# Patient Record
Sex: MALE | Race: BLACK OR AFRICAN AMERICAN | Employment: OTHER | URBAN - METROPOLITAN AREA
[De-identification: names, ages, dates, MRNs, and addresses within clinical notes are randomized per-mention and may not be internally consistent; named-entity substitution may affect disease eponyms.]

---

## 2018-01-06 ENCOUNTER — HOSPITAL ENCOUNTER (EMERGENCY)
Age: 83
Discharge: HOME | End: 2018-01-06
Attending: OPHTHALMOLOGY | Admitting: EMERGENCY MEDICINE
Payer: MEDICARE

## 2018-01-06 VITALS
TEMPERATURE: 97.6 F | BODY MASS INDEX: 32.58 KG/M2 | SYSTOLIC BLOOD PRESSURE: 180 MMHG | DIASTOLIC BLOOD PRESSURE: 101 MMHG | HEART RATE: 73 BPM | WEIGHT: 215 LBS | HEIGHT: 68 IN | RESPIRATION RATE: 12 BRPM | OXYGEN SATURATION: 96 %

## 2018-01-06 DIAGNOSIS — N30.01 ACUTE CYSTITIS WITH HEMATURIA: Primary | ICD-10-CM

## 2018-01-06 LAB
APPEARANCE UR: CLEAR
BACTERIA URNS QL MICRO: NEGATIVE /HPF
BILIRUB UR QL: NEGATIVE
COLOR UR: YELLOW
EPITH CASTS URNS QL MICRO: NORMAL /LPF (ref 0–5)
GLUCOSE UR STRIP.AUTO-MCNC: NEGATIVE MG/DL
HGB UR QL STRIP: ABNORMAL
KETONES UR QL STRIP.AUTO: ABNORMAL MG/DL
LEUKOCYTE ESTERASE UR QL STRIP.AUTO: NEGATIVE
NITRITE UR QL STRIP.AUTO: NEGATIVE
PH UR STRIP: 5.5 [PH] (ref 5–8)
PROT UR STRIP-MCNC: NEGATIVE MG/DL
RBC #/AREA URNS HPF: NORMAL /HPF (ref 0–5)
SP GR UR REFRACTOMETRY: 1.02 (ref 1–1.03)
UROBILINOGEN UR QL STRIP.AUTO: 1 EU/DL (ref 0.2–1)
WBC URNS QL MICRO: NORMAL /HPF (ref 0–4)

## 2018-01-06 PROCEDURE — 81001 URINALYSIS AUTO W/SCOPE: CPT | Performed by: PHYSICIAN ASSISTANT

## 2018-01-06 PROCEDURE — 99283 EMERGENCY DEPT VISIT LOW MDM: CPT

## 2018-01-06 PROCEDURE — 87086 URINE CULTURE/COLONY COUNT: CPT | Performed by: PHYSICIAN ASSISTANT

## 2018-01-06 RX ORDER — CEPHALEXIN 500 MG/1
500 CAPSULE ORAL 2 TIMES DAILY
Qty: 14 CAP | Refills: 0 | Status: SHIPPED | OUTPATIENT
Start: 2018-01-06 | End: 2018-01-13

## 2018-01-07 NOTE — ED PROVIDER NOTES
HPI Comments: [de-identified] yo M c/o dysuria and urinary frequency which started this morning around 0300. States he got up to use the bathroom when he noticed the sx. Has persisted throughout the day. Denies fever, chills, CP, SOB, n/v, abdominal pain, back pain. No other complaints. Patient is a [de-identified] y.o. male presenting with frequency. Urinary Frequency    Associated symptoms include frequency. Pertinent negatives include no chills, no flank pain and no back pain. Past Medical History:   Diagnosis Date    Asthma 1/11/2010    Diabetes ([facility] [address]     DJD (degenerative joint disease) 1/11/2010    DJD of shoulder     DM (diabetes mellitus) ([facility] [address] 1/11/2010    Essential hypertension, benign     HTN (hypertension), benign 1/11/2010    Hypercholesteremia 1/11/2010    Sleep apnea        Past Surgical History:   Procedure Laterality Date    HX CYST REMOVAL           Family History:   Problem Relation Age of Onset    Diabetes Sister     Lung Disease Brother     Diabetes Brother        Social History     Social History    Marital status:      Spouse name: N/A    Number of children: N/A    Years of education: N/A     Occupational History    Not on file. Social History Main Topics    Smoking status: Never Smoker    Smokeless tobacco: Never Used    Alcohol use No    Drug use: No    Sexual activity: Not on file     Other Topics Concern    Not on file     Social History Narrative         ALLERGIES: Review of patient's allergies indicates no known allergies. Review of Systems   Constitutional: Negative for chills and fever. Genitourinary: Positive for dysuria and frequency. Negative for flank pain. Musculoskeletal: Negative for back pain. All other systems reviewed and are negative.       Vitals:    01/06/18 1943   BP: (!) 180/101   Pulse: 73   Resp: 12   Temp: 97.6 °F (36.4 °C)   SpO2: 96%   Weight: 97.5 kg (215 lb)   Height: 5' 8\" (1.727 m)            Physical Exam   Constitutional: He is oriented to person, place, and time. He appears well-developed and well-nourished. No distress. HENT:   Head: Normocephalic and atraumatic. Eyes: Conjunctivae are normal.   Neck: Normal range of motion. Neck supple. Cardiovascular: Normal rate, regular rhythm and normal heart sounds. Pulmonary/Chest: Effort normal and breath sounds normal. No respiratory distress. He has no wheezes. He has no rales. Abdominal: Soft. Normal appearance. He exhibits no distension. There is no tenderness. There is no rebound, no guarding and no CVA tenderness. Musculoskeletal: Normal range of motion. Neurological: He is alert and oriented to person, place, and time. Skin: Skin is warm and dry. Psychiatric: He has a normal mood and affect. His behavior is normal. Judgment and thought content normal.   Nursing note and vitals reviewed.        MDM  Number of Diagnoses or Management Options  Acute cystitis with hematuria:     ED Course       Procedures    -------------------------------------------------------------------------------------------------------------------     EKG INTERPRETATIONS:      RADIOLOGY RESULTS:   No orders to display       LABORATORY RESULTS:  Recent Results (from the past 12 hour(s))   URINALYSIS W/ RFLX MICROSCOPIC    Collection Time: 01/06/18  8:15 PM   Result Value Ref Range    Color YELLOW      Appearance CLEAR      Specific gravity 1.023 1.005 - 1.030      pH (UA) 5.5 5.0 - 8.0      Protein NEGATIVE  NEG mg/dL    Glucose NEGATIVE  NEG mg/dL    Ketone TRACE (A) NEG mg/dL    Bilirubin NEGATIVE  NEG      Blood SMALL (A) NEG      Urobilinogen 1.0 0.2 - 1.0 EU/dL    Nitrites NEGATIVE  NEG      Leukocyte Esterase NEGATIVE  NEG     URINE MICROSCOPIC ONLY    Collection Time: 01/06/18  8:15 PM   Result Value Ref Range    WBC NONE 0 - 4 /hpf    RBC 4 to 10 0 - 5 /hpf    Epithelial cells FEW 0 - 5 /lpf    Bacteria NEGATIVE  NEG /hpf           CONSULTATIONS:        PROGRESS NOTES:    8:56 PM Pt well appearing and in NAD. Small blood noted on UA. Will treat for uti as pt symptomatic. Will need f/u with PCP for further eval if sx do not improve. Lengthy D/W pt regarding possible worsening of pt's condition, need for follow up and strict ED return instructions for any worsening symptoms. DISPOSITION:  ED DIAGNOSIS & DISPOSITION INFORMATION  Diagnosis:   1. Acute cystitis with hematuria          Disposition: home    Follow-up Information     Follow up With Details Comments Contact Info    [facility] EMERGENCY DEPT  Immediately if symptoms worsen [address] [place] [id number] [phone]          Patient's Medications   Start Taking    CEPHALEXIN (KEFLEX) 500 MG CAPSULE    Take 1 Cap by mouth two (2) times a day for 7 days. Continue Taking    AMLODIPINE (NORVASC) 10 MG TABLET    TAKE 1 TABLET BY MOUTH ONCE DAILY    ASPIRIN 81 MG CHEWABLE TABLET    Take 81 mg by mouth daily. FUROSEMIDE (LASIX) 20 MG TABLET    Take 1 Tab by mouth daily. LISINOPRIL (PRINIVIL, ZESTRIL) 2.5 MG TABLET    TAKE 1 TABLET BY MOUTH ONCE DAILY    PIOGLITAZONE (ACTOS) 15 MG TABLET    Take 15 mg by mouth. PIOGLITAZONE-METFORMIN (ACTOPLUS MET)  MG PER TABLET    Take 1 Tab by mouth two (2) times daily (with meals). SILDENAFIL CITRATE (VIAGRA) 100 MG TABLET    Take 1 Tab by mouth as needed. These Medications have changed    No medications on file   Stop Taking    ACETAMINOPHEN-CODEINE (TYLENOL #3) 300-30 MG PER TABLET    Take 1-2 tablets by mouth every four (4) hours as needed for Pain. ATORVASTATIN (LIPITOR) 20 MG TABLET    Take 1 Tab by mouth daily. CYCLOBENZAPRINE (FLEXERIL) 10 MG TABLET    Take 1 Tab by mouth three (3) times daily as needed for Muscle Spasm(s). HYDROCODONE-ACETAMINOPHEN (NORCO) 5-325 MG PER TABLET    Take 1 Tab by mouth every four (4) hours as needed for Pain. Max Daily Amount: 6 Tabs.     LEVOTHYROXINE (SYNTHROID) 25 MCG TABLET    Take 25 mcg by mouth Daily (before breakfast).

## 2018-01-07 NOTE — ED TRIAGE NOTES
Pt reports urinary frequency and some discomfort with urination since yesterday. Pt denies abdominal pain, back pain. Denies n/v/d. Denies fever.

## 2018-01-07 NOTE — DISCHARGE INSTRUCTIONS

## 2018-01-08 LAB
BACTERIA SPEC CULT: NORMAL
SERVICE CMNT-IMP: NORMAL

## 2018-01-27 ENCOUNTER — APPOINTMENT (OUTPATIENT)
Dept: GENERAL RADIOLOGY | Age: 83
End: 2018-01-27
Attending: EMERGENCY MEDICINE
Payer: MEDICARE

## 2018-01-27 ENCOUNTER — HOSPITAL ENCOUNTER (EMERGENCY)
Age: 83
Discharge: HOME | End: 2018-01-27
Attending: EMERGENCY MEDICINE
Payer: MEDICARE

## 2018-01-27 ENCOUNTER — APPOINTMENT (OUTPATIENT)
Dept: CT IMAGING | Age: 83
End: 2018-01-27
Attending: PHYSICIAN ASSISTANT
Payer: MEDICARE

## 2018-01-27 VITALS
OXYGEN SATURATION: 95 % | SYSTOLIC BLOOD PRESSURE: 152 MMHG | RESPIRATION RATE: 20 BRPM | HEIGHT: 68 IN | BODY MASS INDEX: 31.98 KG/M2 | DIASTOLIC BLOOD PRESSURE: 77 MMHG | TEMPERATURE: 98.1 F | WEIGHT: 211 LBS | HEART RATE: 83 BPM

## 2018-01-27 DIAGNOSIS — S00.81XA ABRASION OF FACE, INITIAL ENCOUNTER: Primary | ICD-10-CM

## 2018-01-27 DIAGNOSIS — S05.01XA ABRASION OF RIGHT CORNEA, INITIAL ENCOUNTER: ICD-10-CM

## 2018-01-27 DIAGNOSIS — J01.00 ACUTE MAXILLARY SINUSITIS, RECURRENCE NOT SPECIFIED: ICD-10-CM

## 2018-01-27 DIAGNOSIS — S80.211A ABRASION OF RIGHT KNEE, INITIAL ENCOUNTER: ICD-10-CM

## 2018-01-27 DIAGNOSIS — S80.01XA CONTUSION OF RIGHT KNEE, INITIAL ENCOUNTER: ICD-10-CM

## 2018-01-27 DIAGNOSIS — S05.91XA: ICD-10-CM

## 2018-01-27 PROCEDURE — 70486 CT MAXILLOFACIAL W/O DYE: CPT

## 2018-01-27 PROCEDURE — 90715 TDAP VACCINE 7 YRS/> IM: CPT | Performed by: EMERGENCY MEDICINE

## 2018-01-27 PROCEDURE — 74011000250 HC RX REV CODE- 250: Performed by: EMERGENCY MEDICINE

## 2018-01-27 PROCEDURE — 74011250636 HC RX REV CODE- 250/636: Performed by: EMERGENCY MEDICINE

## 2018-01-27 PROCEDURE — 99283 EMERGENCY DEPT VISIT LOW MDM: CPT

## 2018-01-27 PROCEDURE — 73562 X-RAY EXAM OF KNEE 3: CPT

## 2018-01-27 PROCEDURE — 70488 CT MAXILLOFACIAL W/O & W/DYE: CPT

## 2018-01-27 PROCEDURE — 90471 IMMUNIZATION ADMIN: CPT

## 2018-01-27 PROCEDURE — 70450 CT HEAD/BRAIN W/O DYE: CPT

## 2018-01-27 PROCEDURE — 74011250637 HC RX REV CODE- 250/637: Performed by: EMERGENCY MEDICINE

## 2018-01-27 PROCEDURE — 72125 CT NECK SPINE W/O DYE: CPT

## 2018-01-27 RX ORDER — CIPROFLOXACIN HYDROCHLORIDE 3.5 MG/ML
1 SOLUTION/ DROPS TOPICAL 4 TIMES DAILY
Qty: 2 ML | Refills: 0 | Status: SHIPPED | OUTPATIENT
Start: 2018-01-27 | End: 2018-02-06

## 2018-01-27 RX ORDER — AMOXICILLIN AND CLAVULANATE POTASSIUM 875; 125 MG/1; MG/1
1 TABLET, FILM COATED ORAL 2 TIMES DAILY
Qty: 20 TAB | Refills: 0 | Status: SHIPPED | OUTPATIENT
Start: 2018-01-27 | End: 2018-02-06

## 2018-01-27 RX ORDER — ACETAMINOPHEN AND CODEINE PHOSPHATE 300; 30 MG/1; MG/1
2 TABLET ORAL
Status: COMPLETED | OUTPATIENT
Start: 2018-01-27 | End: 2018-01-27

## 2018-01-27 RX ORDER — ACETAMINOPHEN AND CODEINE PHOSPHATE 300; 30 MG/1; MG/1
1-2 TABLET ORAL
Qty: 20 TAB | Refills: 0 | Status: SHIPPED | OUTPATIENT
Start: 2018-01-27 | End: 2019-04-13

## 2018-01-27 RX ORDER — PROPARACAINE HYDROCHLORIDE 5 MG/ML
1 SOLUTION/ DROPS OPHTHALMIC
Status: COMPLETED | OUTPATIENT
Start: 2018-01-27 | End: 2018-01-27

## 2018-01-27 RX ADMIN — ACETAMINOPHEN AND CODEINE PHOSPHATE 2 TABLET: 300; 30 TABLET ORAL at 21:44

## 2018-01-27 RX ADMIN — PROPARACAINE HYDROCHLORIDE 1 DROP: 5 SOLUTION/ DROPS OPHTHALMIC at 19:47

## 2018-01-27 RX ADMIN — FLUORESCEIN SODIUM 1 STRIP: 1 STRIP OPHTHALMIC at 19:46

## 2018-01-27 RX ADMIN — TETANUS TOXOID, REDUCED DIPHTHERIA TOXOID AND ACELLULAR PERTUSSIS VACCINE, ADSORBED 0.5 ML: 5; 2.5; 8; 8; 2.5 SUSPENSION INTRAMUSCULAR at 20:04

## 2018-01-27 NOTE — ED TRIAGE NOTES
Pt states tripped over a jasper earlier and landed on concrete. No LOC. States hit his right eye, right knee. Has blood oozing from right eye.  Pt drove himself here

## 2018-01-28 NOTE — ED NOTES
I have reviewed discharge instructions with the patient. The patient verbalized understanding. Patient armband removed and shredded  Pt accompanied by family member.

## 2018-01-28 NOTE — ED NOTES
Pt reports this afternoon, tripped over a jack that was left in his driveway. Struck right face/eye, right elbow, right knee on concrete. Denies LOC. Denies n/v/headache. Pt is alert & verbal.  Awaiting evaluation of MD.  Call bell within reach, instructed to call for assistance as needed; pt verbalized understanding.

## 2018-01-28 NOTE — ED PROVIDER NOTES
EMERGENCY DEPARTMENT HISTORY AND PHYSICAL EXAM    7:36 PM      Date: 1/27/2018  Patient Name: Rubin Zhong. History of Presenting Illness     Chief Complaint   Patient presents with    Fall    Facial Pain    Eye Injury    Knee Injury         History Provided By: Patient    Chief Complaint: Eye Pain  Duration: 5 Hours  Timing:  Constant  Location: Right  Quality:        Severity: 7 out of 10  Modifying Factors: Tripped and fell onto concrete  Associated Symptoms: right knee pain      Additional History (Context): Rubin Earl is a [de-identified] y.o. male with hx of HTN, DM, DJD, Asthma, and Hypercholesteremia who presents with c/o constant 7/10 right eye pain onset 5 hours after tripped and falling onto concrete. Pt states he was working on his truck when he was walking to the garage and tripped over a car jack striking his eye and right knee on the concrete with bleeding of both areas. Pt states he was able to walk immediately after accident. PCP: Siria Madison MD    Current Outpatient Prescriptions   Medication Sig Dispense Refill    pioglitazone (ACTOS) 15 mg tablet Take 15 mg by mouth.  amLODIPine (NORVASC) 10 mg tablet TAKE 1 TABLET BY MOUTH ONCE DAILY 30 Tab 0    lisinopril (PRINIVIL, ZESTRIL) 2.5 mg tablet TAKE 1 TABLET BY MOUTH ONCE DAILY 30 Tab 0    pioglitazone-metFORMIN (ACTOPLUS MET)  mg per tablet Take 1 Tab by mouth two (2) times daily (with meals). 60 Tab 6    furosemide (LASIX) 20 mg tablet Take 1 Tab by mouth daily. 30 Tab 6    sildenafil citrate (VIAGRA) 100 mg tablet Take 1 Tab by mouth as needed. 4 Each 3    aspirin 81 mg chewable tablet Take 81 mg by mouth daily.          Past History     Past Medical History:  Past Medical History:   Diagnosis Date    Asthma 1/11/2010    Diabetes ([address]     DJD (degenerative joint disease) 1/11/2010    DJD of shoulder     DM (diabetes mellitus) ([facility] [address] 1/11/2010    Essential hypertension, benign     HTN (hypertension), benign 1/11/2010  Hypercholesteremia 1/11/2010    Sleep apnea        Past Surgical History:  Past Surgical History:   Procedure Laterality Date    HX CYST REMOVAL         Family History:  Family History   Problem Relation Age of Onset    Diabetes Sister     Lung Disease Brother     Diabetes Brother        Social History:  Social History   Substance Use Topics    Smoking status: Never Smoker    Smokeless tobacco: Never Used    Alcohol use No       Allergies:  No Known Allergies      Review of Systems       Review of Systems   Constitutional: Negative. HENT: Negative. Eyes: Positive for pain. Respiratory: Negative. Cardiovascular: Negative. Gastrointestinal: Negative. Endocrine: Negative. Genitourinary: Negative. Musculoskeletal:        Right knee pain   Skin: Negative. Allergic/Immunologic: Negative. Neurological: Negative. Hematological: Negative. Psychiatric/Behavioral: Negative. All other systems reviewed and are negative. Physical Exam     Visit Vitals    /77 (BP 1 Location: Left arm)    Pulse 83    Temp 98.1 °F (36.7 °C)    Resp 20    Ht 5' 8\" (1.727 m)    Wt 95.7 kg (211 lb)    SpO2 95%    BMI 32.08 kg/m2         Physical Exam   Constitutional: He is oriented to person, place, and time. He appears well-developed and well-nourished. No distress. HENT:   Head: Normocephalic. Mouth/Throat: Oropharynx is clear and moist.   Eyes: Conjunctivae and EOM are normal. Pupils are equal, round, and reactive to light. Slit lamp exam:       The right eye shows corneal abrasion (from 4 to 1 o'clock) and fluorescein uptake. The right eye shows no foreign body. Vision is fine. Abrasion to right media canton   Neck: Normal range of motion. Neck supple. Cardiovascular: Normal rate, regular rhythm, normal heart sounds and intact distal pulses. No murmur heard. Pulmonary/Chest: Effort normal and breath sounds normal. No respiratory distress. He has no wheezes.  He has no rales. He exhibits no tenderness. Abdominal: Soft. Bowel sounds are normal. He exhibits no distension. There is no tenderness. There is no rebound. Musculoskeletal: Normal range of motion. He exhibits no edema or tenderness. Neurological: He is alert and oriented to person, place, and time. No cranial nerve deficit. He exhibits normal muscle tone. Coordination normal.   Skin: Skin is warm and dry. No rash noted. Psychiatric: He has a normal mood and affect. His behavior is normal. Judgment and thought content normal.   Nursing note and vitals reviewed. Diagnostic Study Results     Labs -  No results found for this or any previous visit (from the past 12 hour(s)). Radiologic Studies -   CT SPINE CERV WO CONT   Final Result   IMPRESSION:     No CT signs cervical spine trauma.      Multilevel advanced changes cervical spine. CT MAXILLOFACIAL W WO CONT   Final Result   IMPRESSION:     No bony injury of the right orbit. Site of the patient's laceration not  confidently seen.     Chronic appearing orbital floor fracture on the left side with depression.     Metallic foreign body just inferior to the left orbit, might be related to  patient's previous injury.     Dental caries at a right sided maxillary molar. CT HEAD WO CONT   Final Result   IMPRESSION:      No acute brain injury.     Mild burden chronic sinusitis. XR KNEE RT 3 V    (Results Pending)   XR Knee Right 8:32 PM ED Provider Interpretation: Signs of arthritis, no acute fracture. Medical Decision Making   I am the first provider for this patient. I reviewed the vital signs, available nursing notes, past medical history, past surgical history, family history and social history. Vital Signs-Reviewed the patient's vital signs.     Pulse Oximetry Analysis -  95% on room air (Interpretation) Normal    Cardiac Monitor:  Rate: 83 bpm  Rhythm:  Normal Sinus Rhythm         Records Reviewed: Nursing Notes (Time of Review: 7:41 PM)    ED Course: Progress Notes, Reevaluation, and Consults:    Provider Notes (Medical Decision Making):   9:25 PM   DDX:  Orbital floor fracture, entrapment, laceration of face, laceration of eyelid, laceration of duct, intercranial hemmorrhage, sinusitis, contusion of knee, fracture of knee, abrasion of knee      Diagnosis     Clinical Impression:   1. Abrasion of face, initial encounter    2. Abrasion of right cornea, initial encounter    3. Acute maxillary sinusitis, recurrence not specified    4. Abrasion of right knee, initial encounter    5. Contusion of right knee, initial encounter        Disposition: Discharge    Follow-up Information     None           Patient's Medications   Start Taking    No medications on file   Continue Taking    AMLODIPINE (NORVASC) 10 MG TABLET    TAKE 1 TABLET BY MOUTH ONCE DAILY    ASPIRIN 81 MG CHEWABLE TABLET    Take 81 mg by mouth daily. FUROSEMIDE (LASIX) 20 MG TABLET    Take 1 Tab by mouth daily. LISINOPRIL (PRINIVIL, ZESTRIL) 2.5 MG TABLET    TAKE 1 TABLET BY MOUTH ONCE DAILY    PIOGLITAZONE (ACTOS) 15 MG TABLET    Take 15 mg by mouth. PIOGLITAZONE-METFORMIN (ACTOPLUS MET)  MG PER TABLET    Take 1 Tab by mouth two (2) times daily (with meals). SILDENAFIL CITRATE (VIAGRA) 100 MG TABLET    Take 1 Tab by mouth as needed. These Medications have changed    No medications on file   Stop Taking    No medications on file     _______________________________    Attestations:  [address] [place] [address] acting as a scribe for and in the presence of Gema Brower MD      January 27, 2018 at 7:41 PM       Provider Attestation:      I personally performed the services described in the documentation, reviewed the documentation, as recorded by the scribe in my presence, and it accurately and completely records my words and actions.  January 27, 2018 at 7:41 PM - Gema Brower MD    _______________________________

## 2018-01-28 NOTE — DISCHARGE INSTRUCTIONS
Corneal Scratches: Care Instructions  Your Care Instructions    The cornea is the clear surface that covers the front of the eye. When a speck of dirt, a wood chip, an insect, or another object flies into your eye, it can cause a painful scratch on the cornea. Wearing contact lenses too long or rubbing your eyes can also scratch the cornea. Small scratches usually heal in a day or two. Deeper scratches may take longer. If you have had a foreign object removed from your eye or you have a corneal scratch, you will need to watch for infection and vision problems while your eye heals. Follow-up care is a key part of your treatment and safety. Be sure to make and go to all appointments, and call your doctor if you are having problems. It's also a good idea to know your test results and keep a list of the medicines you take. How can you care for yourself at home? · The doctor probably used a medicine during your exam to numb your eye. When it wears off in 30 to 60 minutes, your eye pain may come back. Take pain medicines exactly as directed. ¨ If the doctor gave you a prescription medicine for pain, take it as prescribed. ¨ If you are not taking a prescription pain medicine, ask your doctor if you can take an over-the-counter medicine. ¨ Do not take two or more pain medicines at the same time unless the doctor told you to. Many pain medicines have acetaminophen, which is Tylenol. Too much acetaminophen (Tylenol) can be harmful. · Do not rub your injured eye. Rubbing can make it worse. · Use the prescribed eyedrops or ointment as directed. Be sure the dropper or bottle tip is clean. To put in eyedrops or ointment:  ¨ Tilt your head back, and pull your lower eyelid down with one finger. ¨ Drop or squirt the medicine inside the lower lid. ¨ Close your eye for 30 to 60 seconds to let the drops or ointment move around. ¨ Do not touch the ointment or dropper tip to your eyelashes or any other surface.   · Do not use your contact lens in your hurt eye until your doctor says you can. Also, do not wear eye makeup until your eye has healed. · Do not drive if you have blurred vision. · Bright light may hurt. Sunglasses can help. · To prevent eye injuries in the future, wear safety glasses or goggles when you work with machines or tools, mow the lawn, or ride a bike or motorcycle. When should you call for help? Call your doctor now or seek immediate medical care if:  ? · You have signs of an eye infection, such as:  ¨ Pus or thick discharge coming from the eye. ¨ Redness or swelling around the eye. ¨ A fever. ? · You have new or worse eye pain. ? · You have vision changes. ? · It feels like there is something in your eye. ? · Light hurts your eye. ? Watch closely for changes in your health, and be sure to contact your doctor if:  ? · You do not get better as expected. Where can you learn more? Go to http://farideh-rob.info/. Enter W973 in the search box to learn more about \"Corneal Scratches: Care Instructions. \"  Current as of: March 3, 2017  Content Version: 11.4  © 8518-5205 [employer]. Care instructions adapted under license by [employer] (which disclaims liability or warranty for this information). If you have questions about a medical condition or this instruction, always ask your healthcare professional. [place] [address] any warranty or liability for your use of this information. Contusion: Care Instructions  Your Care Instructions    Contusion is the medical term for a bruise. It is the result of a direct blow or an impact, such as a fall. Contusions are common sports injuries. Most people think of a bruise as a black-and-blue spot. This happens when small blood vessels get torn and leak blood under the skin. But bones, muscles, and organs can also get bruised. This may damage deep tissues but not cause a bruise you can see.   The doctor will do a physical exam to find the location of your contusion. You may also have tests to make sure you do not have a more serious injury, such as a broken bone or nerve damage. These may include X-rays or other imaging tests like a CT scan or MRI. Deep-tissue contusions may cause pain and swelling. But if there is no serious damage, they will often get better in a few weeks with home treatment. The doctor has checked you carefully, but problems can develop later. If you notice any problems or new symptoms, get medical treatment right away. Follow-up care is a key part of your treatment and safety. Be sure to make and go to all appointments, and call your doctor if you are having problems. It's also a good idea to know your test results and keep a list of the medicines you take. How can you care for yourself at home? · Put ice or a cold pack on the sore area for 10 to 20 minutes at a time to stop swelling. Put a thin cloth between the ice pack and your skin. · Be safe with medicines. Read and follow all instructions on the label. ¨ If the doctor gave you a prescription medicine for pain, take it as prescribed. ¨ If you are not taking a prescription pain medicine, ask your doctor if you can take an over-the-counter medicine. · If you can, prop up the sore area on pillows as much as possible for the next few days. Try to keep the sore area above the level of your heart. When should you call for help? Call your doctor now or seek immediate medical care if:  ? · Your pain gets worse. ? · You have new or worse swelling. ? · You have tingling, weakness, or numbness in the area near the contusion. ? · The area near the contusion is cold or pale. ? Watch closely for changes in your health, and be sure to contact your doctor if:  ? · You do not get better as expected. Where can you learn more? Go to http://farideh-rob.info/.   Enter P258 in the search box to learn more about \"Contusion: Care Instructions. \"  Current as of: March 20, 2017  Content Version: 11.4  © 1258-4367 [employer]. Care instructions adapted under license by [employer] (which disclaims liability or warranty for this information). If you have questions about a medical condition or this instruction, always ask your healthcare professional. [address] any warranty or liability for your use of this information. Sinusitis: Care Instructions  Your Care Instructions    Sinusitis is an infection of the lining of the sinus cavities in your head. Sinusitis often follows a cold. It causes pain and pressure in your head and face. In most cases, sinusitis gets better on its own in 1 to 2 weeks. But some mild symptoms may last for several weeks. Sometimes antibiotics are needed. Follow-up care is a key part of your treatment and safety. Be sure to make and go to all appointments, and call your doctor if you are having problems. It's also a good idea to know your test results and keep a list of the medicines you take. How can you care for yourself at home? · Take an over-the-counter pain medicine, such as acetaminophen (Tylenol), ibuprofen (Advil, Motrin), or naproxen (Aleve). Read and follow all instructions on the label. · If the doctor prescribed antibiotics, take them as directed. Do not stop taking them just because you feel better. You need to take the full course of antibiotics. · Be careful when taking over-the-counter cold or flu medicines and Tylenol at the same time. Many of these medicines have acetaminophen, which is Tylenol. Read the labels to make sure that you are not taking more than the recommended dose. Too much acetaminophen (Tylenol) can be harmful. · Breathe warm, moist air from a steamy shower, a hot bath, or a sink filled with hot water. Avoid cold, dry air. Using a humidifier in your home may help.  Follow the directions for cleaning the machine. · Use saline (saltwater) nasal washes to help keep your nasal passages open and wash out mucus and bacteria. You can buy saline nose drops at a grocery store or drugstore. Or you can make your own at home by adding 1 teaspoon of salt and 1 teaspoon of baking soda to 2 cups of distilled water. If you make your own, fill a bulb syringe with the solution, insert the tip into your nostril, and squeeze gently. Jarocho Cheri your nose. · Put a hot, wet towel or a warm gel pack on your face 3 or 4 times a day for 5 to 10 minutes each time. · Try a decongestant nasal spray like oxymetazoline (Afrin). Do not use it for more than 3 days in a row. Using it for more than 3 days can make your congestion worse. When should you call for help? Call your doctor now or seek immediate medical care if:  ? · You have new or worse swelling or redness in your face or around your eyes. ? · You have a new or higher fever. ? Watch closely for changes in your health, and be sure to contact your doctor if:  ? · You have new or worse facial pain. ? · The mucus from your nose becomes thicker (like pus) or has new blood in it. ? · You are not getting better as expected. Where can you learn more? Go to http://farideh-rob.info/. Enter X952 in the search box to learn more about \"Sinusitis: Care Instructions. \"  Current as of: May 12, 2017  Content Version: 11.4  © 7608-9982 [employer]. Care instructions adapted under license by [email or web address] (which disclaims liability or warranty for this information). If you have questions about a medical condition or this instruction, always ask your healthcare professional. [place] [address] any warranty or liability for your use of this information. Scrapes (Abrasions): Care Instructions  Your Care Instructions  Scrapes (abrasions) are wounds where your skin has been rubbed or torn off.  Most scrapes do not go deep into the skin, but some may remove several layers of skin. Scrapes usually don't bleed much, but they may ooze pinkish fluid. Scrapes on the head or face may appear worse than they are. They may bleed a lot because of the good blood supply to this area. Most scrapes heal well and may not need a bandage. They usually heal within 3 to 7 days. A large, deep scrape may take 1 to 2 weeks or longer to heal. A scab may form on some scrapes. Follow-up care is a key part of your treatment and safety. Be sure to make and go to all appointments, and call your doctor if you are having problems. It's also a good idea to know your test results and keep a list of the medicines you take. How can you care for yourself at home? · If your doctor told you how to care for your wound, follow your doctor's instructions. If you did not get instructions, follow this general advice:  ¨ Wash the scrape with clean water 2 times a day. Don't use hydrogen peroxide or alcohol, which can slow healing. ¨ You may cover the scrape with a thin layer of petroleum jelly, such as Vaseline, and a nonstick bandage. ¨ Apply more petroleum jelly and replace the bandage as needed. · Prop up the injured area on a pillow anytime you sit or lie down during the next 3 days. Try to keep it above the level of your heart. This will help reduce swelling. · Be safe with medicines. Take pain medicines exactly as directed. ¨ If the doctor gave you a prescription medicine for pain, take it as prescribed. ¨ If you are not taking a prescription pain medicine, ask your doctor if you can take an over-the-counter medicine. When should you call for help? Call your doctor now or seek immediate medical care if:  ? · You have signs of infection, such as:  ¨ Increased pain, swelling, warmth, or redness around the scrape. ¨ Red streaks leading from the scrape. ¨ Pus draining from the scrape. ¨ A fever. ? · The scrape starts to bleed, and blood soaks through the bandage.  Oozing small amounts of blood is normal.   ? Watch closely for changes in your health, and be sure to contact your doctor if the scrape is not getting better each day. Where can you learn more? Go to http://farideh-rob.info/. Enter A374 in the search box to learn more about \"Scrapes (Abrasions): Care Instructions. \"  Current as of: March 20, 2017  Content Version: 11.4  © 5314-1578 [employer]. Care instructions adapted under license by [employer] (which disclaims liability or warranty for this information). If you have questions about a medical condition or this instruction, always ask your healthcare professional. [address] any warranty or liability for your use of this information.

## 2018-03-12 ENCOUNTER — HOSPITAL ENCOUNTER (OUTPATIENT)
Dept: BONE DENSITY | Age: 83
Discharge: HOME | End: 2018-03-12
Attending: FAMILY MEDICINE
Payer: MEDICARE

## 2018-03-12 DIAGNOSIS — M81.0 OSTEOPOROSIS: ICD-10-CM

## 2018-03-12 PROCEDURE — 77080 DXA BONE DENSITY AXIAL: CPT

## 2019-04-13 ENCOUNTER — HOSPITAL ENCOUNTER (EMERGENCY)
Age: 84
Discharge: HOME | End: 2019-04-13
Attending: EMERGENCY MEDICINE
Payer: MEDICARE

## 2019-04-13 ENCOUNTER — APPOINTMENT (OUTPATIENT)
Dept: GENERAL RADIOLOGY | Age: 84
End: 2019-04-13
Attending: EMERGENCY MEDICINE
Payer: MEDICARE

## 2019-04-13 VITALS
RESPIRATION RATE: 17 BRPM | OXYGEN SATURATION: 99 % | HEIGHT: 70 IN | WEIGHT: 214 LBS | TEMPERATURE: 97.6 F | HEART RATE: 76 BPM | DIASTOLIC BLOOD PRESSURE: 76 MMHG | BODY MASS INDEX: 30.64 KG/M2 | SYSTOLIC BLOOD PRESSURE: 138 MMHG

## 2019-04-13 DIAGNOSIS — M79.602 PAIN OF LEFT UPPER EXTREMITY: Primary | ICD-10-CM

## 2019-04-13 LAB
ALBUMIN SERPL-MCNC: 3.8 G/DL (ref 3.4–5)
ALBUMIN/GLOB SERPL: 1 {RATIO} (ref 0.8–1.7)
ALP SERPL-CCNC: 50 U/L (ref 45–117)
ALT SERPL-CCNC: 26 U/L (ref 16–61)
ANION GAP SERPL CALC-SCNC: 7 MMOL/L (ref 3–18)
AST SERPL-CCNC: 16 U/L (ref 15–37)
BASOPHILS # BLD: 0 K/UL (ref 0–0.1)
BASOPHILS NFR BLD: 0 % (ref 0–2)
BILIRUB SERPL-MCNC: 0.8 MG/DL (ref 0.2–1)
BUN SERPL-MCNC: 20 MG/DL (ref 7–18)
BUN/CREAT SERPL: 19 (ref 12–20)
CALCIUM SERPL-MCNC: 8.7 MG/DL (ref 8.5–10.1)
CHLORIDE SERPL-SCNC: 107 MMOL/L (ref 100–108)
CK MB CFR SERPL CALC: 1.4 % (ref 0–4)
CK MB CFR SERPL CALC: 1.6 % (ref 0–4)
CK MB SERPL-MCNC: 2.1 NG/ML (ref 5–25)
CK MB SERPL-MCNC: 2.7 NG/ML (ref 5–25)
CK SERPL-CCNC: 150 U/L (ref 39–308)
CK SERPL-CCNC: 167 U/L (ref 39–308)
CO2 SERPL-SCNC: 30 MMOL/L (ref 21–32)
CREAT SERPL-MCNC: 1.05 MG/DL (ref 0.6–1.3)
DIFFERENTIAL METHOD BLD: ABNORMAL
EOSINOPHIL # BLD: 0.1 K/UL (ref 0–0.4)
EOSINOPHIL NFR BLD: 1 % (ref 0–5)
ERYTHROCYTE [DISTWIDTH] IN BLOOD BY AUTOMATED COUNT: 12.4 % (ref 11.6–14.5)
GLOBULIN SER CALC-MCNC: 3.8 G/DL (ref 2–4)
GLUCOSE SERPL-MCNC: 146 MG/DL (ref 74–99)
HCT VFR BLD AUTO: 43.1 % (ref 36–48)
HGB BLD-MCNC: 14 G/DL (ref 13–16)
LYMPHOCYTES # BLD: 2.6 K/UL (ref 0.9–3.6)
LYMPHOCYTES NFR BLD: 43 % (ref 21–52)
MCH RBC QN AUTO: 29.9 PG (ref 24–34)
MCHC RBC AUTO-ENTMCNC: 32.5 G/DL (ref 31–37)
MCV RBC AUTO: 91.9 FL (ref 74–97)
MONOCYTES # BLD: 0.6 K/UL (ref 0.05–1.2)
MONOCYTES NFR BLD: 9 % (ref 3–10)
NEUTS SEG # BLD: 2.9 K/UL (ref 1.8–8)
NEUTS SEG NFR BLD: 47 % (ref 40–73)
PLATELET # BLD AUTO: 179 K/UL (ref 135–420)
PMV BLD AUTO: 10 FL (ref 9.2–11.8)
POTASSIUM SERPL-SCNC: 3.8 MMOL/L (ref 3.5–5.5)
PROT SERPL-MCNC: 7.6 G/DL (ref 6.4–8.2)
RBC # BLD AUTO: 4.69 M/UL (ref 4.7–5.5)
SODIUM SERPL-SCNC: 144 MMOL/L (ref 136–145)
TROPONIN I SERPL-MCNC: 0.02 NG/ML (ref 0–0.04)
TROPONIN I SERPL-MCNC: <0.02 NG/ML (ref 0–0.04)
WBC # BLD AUTO: 6.1 K/UL (ref 4.6–13.2)

## 2019-04-13 PROCEDURE — 82550 ASSAY OF CK (CPK): CPT

## 2019-04-13 PROCEDURE — 93005 ELECTROCARDIOGRAM TRACING: CPT

## 2019-04-13 PROCEDURE — 74011250636 HC RX REV CODE- 250/636: Performed by: EMERGENCY MEDICINE

## 2019-04-13 PROCEDURE — 80053 COMPREHEN METABOLIC PANEL: CPT

## 2019-04-13 PROCEDURE — 96374 THER/PROPH/DIAG INJ IV PUSH: CPT

## 2019-04-13 PROCEDURE — 99285 EMERGENCY DEPT VISIT HI MDM: CPT

## 2019-04-13 PROCEDURE — 73060 X-RAY EXAM OF HUMERUS: CPT

## 2019-04-13 PROCEDURE — 71045 X-RAY EXAM CHEST 1 VIEW: CPT

## 2019-04-13 PROCEDURE — 85025 COMPLETE CBC W/AUTO DIFF WBC: CPT

## 2019-04-13 PROCEDURE — 74011250637 HC RX REV CODE- 250/637: Performed by: EMERGENCY MEDICINE

## 2019-04-13 PROCEDURE — 96375 TX/PRO/DX INJ NEW DRUG ADDON: CPT

## 2019-04-13 RX ORDER — MORPHINE SULFATE 2 MG/ML
2 INJECTION, SOLUTION INTRAMUSCULAR; INTRAVENOUS
Status: COMPLETED | OUTPATIENT
Start: 2019-04-13 | End: 2019-04-13

## 2019-04-13 RX ORDER — ONDANSETRON 2 MG/ML
4 INJECTION INTRAMUSCULAR; INTRAVENOUS
Status: COMPLETED | OUTPATIENT
Start: 2019-04-13 | End: 2019-04-13

## 2019-04-13 RX ORDER — ACETAMINOPHEN AND CODEINE PHOSPHATE 300; 30 MG/1; MG/1
1 TABLET ORAL
Qty: 10 TAB | Refills: 0 | Status: SHIPPED | OUTPATIENT
Start: 2019-04-13 | End: 2019-04-16

## 2019-04-13 RX ORDER — GUAIFENESIN 100 MG/5ML
324 LIQUID (ML) ORAL
Status: COMPLETED | OUTPATIENT
Start: 2019-04-13 | End: 2019-04-13

## 2019-04-13 RX ADMIN — ASPIRIN 81 MG 324 MG: 81 TABLET ORAL at 10:39

## 2019-04-13 RX ADMIN — MORPHINE SULFATE 2 MG: 2 INJECTION, SOLUTION INTRAMUSCULAR; INTRAVENOUS at 10:54

## 2019-04-13 RX ADMIN — ONDANSETRON 4 MG: 2 INJECTION INTRAMUSCULAR; INTRAVENOUS at 10:54

## 2019-04-13 NOTE — ED PROVIDER NOTES
EMERGENCY DEPARTMENT HISTORY AND PHYSICAL EXAM 
 
10:37 AM 
 
 
Date: 4/13/2019 Patient Name: Hong Fonseca. History of Presenting Illness Chief Complaint Patient presents with  Arm Pain History Provided By: Patient Additional History (Context): Hong Sinha is a 82 y.o. male with diabetes and hypertension who presents with left arm pain that started yesterday. 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 He denies any chest pain, shortness of breath, nausea, vomiting or diarrhea. He denies any trauma to the left arm. He did not take his aspirin today. Pain to the left arm is 10 out of 10 nonradiating. Nice smoking alcohol or recreational drug use PCP: Gricelda, MD Siria 
 
 
Current Outpatient Medications Medication Sig Dispense Refill  acetaminophen-codeine (TYLENOL-CODEINE #3) 300-30 mg per tablet Take 1 Tab by mouth every four (4) hours as needed for Pain for up to 3 days. Max Daily Amount: 6 Tabs. 10 Tab 0  pioglitazone (ACTOS) 15 mg tablet Take 15 mg by mouth.  amLODIPine (NORVASC) 10 mg tablet TAKE 1 TABLET BY MOUTH ONCE DAILY 30 Tab 0  
 lisinopril (PRINIVIL, ZESTRIL) 2.5 mg tablet TAKE 1 TABLET BY MOUTH ONCE DAILY 30 Tab 0  pioglitazone-metFORMIN (ACTOPLUS MET)  mg per tablet Take 1 Tab by mouth two (2) times daily (with meals). 60 Tab 6  furosemide (LASIX) 20 mg tablet Take 1 Tab by mouth daily. 30 Tab 6  
 sildenafil citrate (VIAGRA) 100 mg tablet Take 1 Tab by mouth as needed. 4 Each 3  
 aspirin 81 mg chewable tablet Take 81 mg by mouth daily. Past History Past Medical History: 
Past Medical History:  
Diagnosis Date  Asthma 1/11/2010  Diabetes ([address]  DJD (degenerative joint disease) 1/11/2010  DJD of shoulder  DM (diabetes mellitus) ([address] 1/11/2010  Essential hypertension, benign  HTN (hypertension), benign 1/11/2010  Hypercholesteremia 1/11/2010  Sleep apnea Past Surgical History: 
Past Surgical History:  
Procedure Laterality Date  HX CYST REMOVAL Family History: 
Family History Problem Relation Age of Onset  Diabetes Sister  Lung Disease Brother  Diabetes Brother Social History: 
Social History Tobacco Use  Smoking status: Never Smoker  Smokeless tobacco: Never Used Substance Use Topics  Alcohol use: No  
 Drug use: No  
 
 
Allergies: 
No Known Allergies Review of Systems Review of Systems Constitutional: Negative. Negative for chills, diaphoresis and fever. HENT: Negative. Negative for congestion, rhinorrhea and sore throat. Eyes: Negative. Negative for pain, discharge and redness. Respiratory: Negative. Negative for cough, chest tightness, shortness of breath and wheezing. Cardiovascular: Negative. Negative for chest pain. Gastrointestinal: Negative. Negative for abdominal pain, constipation, diarrhea, nausea and vomiting. Genitourinary: Negative. Negative for dysuria, flank pain, frequency, hematuria and urgency. Musculoskeletal: Negative. Negative for back pain and neck pain. Skin: Negative. Negative for rash. Neurological: Negative. Negative for syncope, weakness, numbness and headaches. Psychiatric/Behavioral: Negative. All other systems reviewed and are negative. Physical Exam  
 
Visit Vitals /76 Pulse 76 Temp 97.6 °F (36.4 °C) Resp 17 Ht 5' 10\" (1.778 m) Wt 97.1 kg (214 lb) SpO2 99% BMI 30.71 kg/m² Physical Exam  
Constitutional: He appears well-developed and well-nourished. Non-toxic appearance. He does not have a sickly appearance. He does not appear ill. No distress. HENT:  
Head: Normocephalic and atraumatic. Mouth/Throat: Oropharynx is clear and moist. No oropharyngeal exudate. Eyes: Pupils are equal, round, and reactive to light. Conjunctivae and EOM are normal. No scleral icterus. Neck: Normal range of motion. Neck supple. No hepatojugular reflux and no JVD present. No tracheal deviation present. No thyromegaly present. Cardiovascular: Normal rate, regular rhythm, S1 normal, S2 normal, normal heart sounds, intact distal pulses and normal pulses. Exam reveals no gallop, no S3 and no S4. No murmur heard. Pulses: 
     Radial pulses are 2+ on the right side, and 2+ on the left side. Dorsalis pedis pulses are 2+ on the right side, and 2+ on the left side. Pulmonary/Chest: Effort normal and breath sounds normal. No respiratory distress. He has no decreased breath sounds. He has no wheezes. He has no rhonchi. He has no rales. Abdominal: Soft. Normal appearance and bowel sounds are normal. He exhibits no distension and no mass. There is no hepatosplenomegaly. There is no tenderness. There is no rigidity, no rebound, no guarding, no CVA tenderness, no tenderness at McBurney's point and negative Figueroa's sign. Musculoskeletal: Normal range of motion. Left upper arm: He exhibits tenderness. He exhibits no bony tenderness, no swelling, no edema, no deformity and no laceration. Arms: 
Lymphadenopathy:  
     Head (right side): No submental, no submandibular, no preauricular and no occipital adenopathy present. Head (left side): No submental, no submandibular, no preauricular and no occipital adenopathy present. He has no cervical adenopathy. Right: No supraclavicular adenopathy present. Left: No supraclavicular adenopathy present. Neurological: He is alert. He has normal strength and normal reflexes. He is not disoriented. No cranial nerve deficit or sensory deficit. Coordination and gait normal. GCS eye subscore is 4. GCS verbal subscore is 5. GCS motor subscore is 6. Skin: Skin is warm, dry and intact. No rash noted. He is not diaphoretic. Psychiatric: He has a normal mood and affect.  His speech is normal and behavior is normal. Judgment and thought content normal. Cognition and memory are normal.  
Nursing note and vitals reviewed. Diagnostic Study Results Labs - Recent Results (from the past 12 hour(s)) EKG, 12 LEAD, INITIAL Collection Time: 04/13/19 10:34 AM  
Result Value Ref Range Ventricular Rate 74 BPM  
 Atrial Rate 74 BPM  
 P-R Interval 166 ms  
 QRS Duration 92 ms Q-T Interval 388 ms QTC Calculation (Bezet) 430 ms Calculated P Axis 54 degrees Calculated R Axis 49 degrees Calculated T Axis 43 degrees Diagnosis Normal sinus rhythm with sinus arrhythmia Nonspecific T wave abnormality Abnormal ECG When compared with ECG of 15-JUL-2015 20:12, No significant change was found CBC WITH AUTOMATED DIFF Collection Time: 04/13/19 10:45 AM  
Result Value Ref Range WBC 6.1 4.6 - 13.2 K/uL  
 RBC 4.69 (L) 4.70 - 5.50 M/uL  
 HGB 14.0 13.0 - 16.0 g/dL HCT 43.1 36.0 - 48.0 % MCV 91.9 74.0 - 97.0 FL  
 MCH 29.9 24.0 - 34.0 PG  
 MCHC 32.5 31.0 - 37.0 g/dL  
 RDW 12.4 11.6 - 14.5 % PLATELET 238 951 - 591 K/uL MPV 10.0 9.2 - 11.8 FL  
 NEUTROPHILS 47 40 - 73 % LYMPHOCYTES 43 21 - 52 % MONOCYTES 9 3 - 10 % EOSINOPHILS 1 0 - 5 % BASOPHILS 0 0 - 2 %  
 ABS. NEUTROPHILS 2.9 1.8 - 8.0 K/UL  
 ABS. LYMPHOCYTES 2.6 0.9 - 3.6 K/UL  
 ABS. MONOCYTES 0.6 0.05 - 1.2 K/UL  
 ABS. EOSINOPHILS 0.1 0.0 - 0.4 K/UL  
 ABS. BASOPHILS 0.0 0.0 - 0.1 K/UL  
 DF AUTOMATED METABOLIC PANEL, COMPREHENSIVE Collection Time: 04/13/19 10:45 AM  
Result Value Ref Range Sodium 144 136 - 145 mmol/L Potassium 3.8 3.5 - 5.5 mmol/L Chloride 107 100 - 108 mmol/L  
 CO2 30 21 - 32 mmol/L Anion gap 7 3.0 - 18 mmol/L Glucose 146 (H) 74 - 99 mg/dL BUN 20 (H) 7.0 - 18 MG/DL Creatinine 1.05 0.6 - 1.3 MG/DL  
 BUN/Creatinine ratio 19 12 - 20 GFR est AA >60 >60 ml/min/1.73m2 GFR est non-AA >60 >60 ml/min/1.73m2  Calcium 8.7 8.5 - 10.1 MG/DL  
 Bilirubin, total 0.8 0.2 - 1.0 MG/DL  
 ALT (SGPT) 26 16 - 61 U/L  
 AST (SGOT) 16 15 - 37 U/L Alk. phosphatase 50 45 - 117 U/L Protein, total 7.6 6.4 - 8.2 g/dL Albumin 3.8 3.4 - 5.0 g/dL Globulin 3.8 2.0 - 4.0 g/dL A-G Ratio 1.0 0.8 - 1.7 CARDIAC PANEL,(CK, CKMB & TROPONIN) Collection Time: 04/13/19 10:45 AM  
Result Value Ref Range  39 - 308 U/L  
 CK - MB 2.7 <3.6 ng/ml CK-MB Index 1.6 0.0 - 4.0 % Troponin-I, QT <0.02 0.0 - 0.045 NG/ML  
CARDIAC PANEL,(CK, CKMB & TROPONIN) Collection Time: 04/13/19  1:35 PM  
Result Value Ref Range  39 - 308 U/L  
 CK - MB 2.1 <3.6 ng/ml CK-MB Index 1.4 0.0 - 4.0 % Troponin-I, QT 0.02 0.0 - 0.045 NG/ML Radiologic Studies -  
XR CHEST PORT Final Result IMPRESSION: Stable chest. No acute findings. XR HUMERUS LT Final Result IMPRESSION:  
  
Humerus appears intact. No acute findings. Medical Decision Making Provider Notes (Medical Decision Making): MDM Number of Diagnoses or Management Options Pain of left upper extremity:  
Diagnosis management comments: DIFFERENTIAL DIAGNOSES/ MEDICAL DECISION MAKING: 
Chest pain etiologies include acute cardiac events to include possible acute myocardial infarction, acute coronary syndrome, pneumonia, chest wall pain (myofascial/ musculoskeletal etiology), chronic obstructive pulmonary disease (copd), acute asthma exacerbation, congestive heart failure, acute bronchitis, pulmonary embolism, upper respiratory infection, referred abdominal pain, other etiologies, versus combination of the above. I am the first provider for this patient. I reviewed the vital signs, available nursing notes, past medical history, past surgical history, family history and social history. Vital Signs-Reviewed the patient's vital signs. Records Reviewed: Nursing Notes (Time of Review: 10:37 AM) ED Course: Progress Notes, Reevaluation, and Consults: Labs essentially normal.  Cardiac enzymes negative. Chest X-Ray showed No acute process. X-ray of the left humerus showed no acute process. EKG showed NSR with rate of 74 bpm. With no ST elevations or depression and non specific T wave changes. 10:37 AM 4/13/2019 Patient reassessed. No pain at this time. Discussed repeat troponin. Repeat troponin negative 2. Aspirin: Aspirin was given Diagnosis I have reassessed the patient. Patient is feeling well. Pain free at this time. Patient will be prescribed Tylenol #3. Patient was discharged in stable condition. Patient is to return to emergency department if any new or worsening condition. Clinical Impression: 1. Pain of left upper extremity Disposition: Discharged home Follow-up Information Follow up With Specialties Details Why Contact Info [address] [place] [address] 
[phone] Attestation Provider Attestation:    
I personally performed the services described in the documentation, reviewed the documentation and it accurately and completely records my words and actions utilizing the [address] [place] [address] April 13, 2019 at 2:36 PM - Nadia Wise DO Disclaimer. It is dictated using utilizing voice recognition software. Unfortunately this leads to occasional typographical errors. I apologize in advance if the situation occurs. If questions arise please do not hesitate to contact me or call our department.

## 2019-04-13 NOTE — ED NOTES
Comfort Carlson is a 82 y.o. male that was discharged in stable condition. The patients diagnosis, condition and treatment were explained to  patient and aftercare instructions were given. The patient verbalized understanding. Patient armband removed and shredded.

## 2019-04-14 LAB
ATRIAL RATE: 74 BPM
CALCULATED P AXIS, ECG09: 54 DEGREES
CALCULATED R AXIS, ECG10: 49 DEGREES
CALCULATED T AXIS, ECG11: 43 DEGREES
DIAGNOSIS, 93000: NORMAL
P-R INTERVAL, ECG05: 166 MS
Q-T INTERVAL, ECG07: 388 MS
QRS DURATION, ECG06: 92 MS
QTC CALCULATION (BEZET), ECG08: 430 MS
VENTRICULAR RATE, ECG03: 74 BPM

## 2019-07-11 ENCOUNTER — OFFICE VISIT (OUTPATIENT)
Dept: ORTHOPEDIC SURGERY | Facility: CLINIC | Age: 84
End: 2019-07-11

## 2019-07-11 VITALS
OXYGEN SATURATION: 95 % | HEART RATE: 66 BPM | RESPIRATION RATE: 16 BRPM | DIASTOLIC BLOOD PRESSURE: 64 MMHG | TEMPERATURE: 95.9 F | WEIGHT: 214.8 LBS | BODY MASS INDEX: 30.75 KG/M2 | SYSTOLIC BLOOD PRESSURE: 133 MMHG | HEIGHT: 70 IN

## 2019-07-11 DIAGNOSIS — R20.2 NUMBNESS AND TINGLING IN LEFT HAND: ICD-10-CM

## 2019-07-11 DIAGNOSIS — S66.812A RUPTURE OF FLEXOR TENDON OF LEFT HAND, INITIAL ENCOUNTER: Primary | ICD-10-CM

## 2019-07-11 DIAGNOSIS — R20.0 NUMBNESS AND TINGLING IN LEFT HAND: ICD-10-CM

## 2019-07-11 NOTE — PROGRESS NOTES
Patient: Carlene Torres MRN: [id number]       SSN: [id number]  YOB: 1935        AGE: 82 y.o. SEX: male    PCP: Siria Madison MD  07/11/19    Chief Complaint   Patient presents with    Hand Pain     left, NKI    Arm Pain     left, NKI    Elbow Pain     left, NKI     HISTORY:  Carlene Torres is a 82 y.o. male who is seen for left wrist and hand pain. He has been experiencing numbness and tingling for the past 3 weeks. He woke up with a needle like pain and was unable to flex his left index finger. He is experiencing numbness and tingling in his left ring and little finger. He reports difficulty pinching, grabbing, and holding items. He reports increased numbness and tingling in the ulnar distribution at night and with use. Pain Assessment  7/11/2019   Location of Pain Arm;Hand;Elbow   Location Modifiers Left   Quality of Pain Aching   Quality of Pain Comment numbness   Duration of Pain Persistent   Frequency of Pain Constant   Aggravating Factors (No Data)   Aggravating Factors Comment worse when using left upper extremity   Limiting Behavior Some   Relieving Factors Other (Comment)   Relieving Factors Comment RX meds alittle   Result of Injury No     Occupation, etc:  Mr. Riki Castañeda is a retired . He previously worked at [employer] and [employer]. He retired 15 years ago from [employer]. He lives in [place] with his wife, step-daughter and her . He is a diabetic. Mr. Riki Castañeda weighs 214 lbs and is 5'10\" tall.        Lab Results   Component Value Date/Time    Hemoglobin A1c 5.6 06/21/2012 11:10 AM     Weight Metrics 7/11/2019 4/13/2019 1/27/2018 1/6/2018 7/15/2015 9/10/2014 7/17/2014   Weight 214 lb 12.8 oz 214 lb 211 lb 215 lb 234 lb 229 lb 227 lb   BMI 30.82 kg/m2 30.71 kg/m2 32.08 kg/m2 32.69 kg/m2 35.59 kg/m2 34.83 kg/m2 34.52 kg/m2       Patient Active Problem List   Diagnosis Code    HTN (hypertension), benign I10    DM (diabetes mellitus) ([facility] [address] E11.9    DJD (degenerative joint disease) M19.90    Asthma J45.909    Hypercholesteremia E78.00    Sleep apnea G47.30     REVIEW OF SYSTEMS: All Below are Negative except: See HPI   Constitutional: negative for fever, chills, and weight loss. Cardiovascular: negative for chest pain, claudication, leg swelling, SOB, CALIX   Gastrointestinal: Negative for pain, N/V/C/D, Blood in stool or urine, dysuria, hematuria, incontinence, pelvic pain. Musculoskeletal: See HPI   Neurological: Negative for dizziness and weakness. Negative for headaches, Visual changes, confusion, seizures   Phychiatric/Behavioral: Negative for depression, memory loss, substance abuse. Extremities: Negative for hair changes, rash, or skin lesion changes. Hematologic: Negative for bleeding problems, bruising, pallor or swollen lymph nodes   Peripheral Vascular: No calf pain, no circulation deficits.     Social History     Socioeconomic History    Marital status:      Spouse name: Not on file    Number of children: Not on file    Years of education: Not on file    Highest education level: Not on file   Occupational History    Not on file   Social Needs    Financial resource strain: Not on file    Food insecurity:     Worry: Not on file     Inability: Not on file    Transportation needs:     Medical: Not on file     Non-medical: Not on file   Tobacco Use    Smoking status: Never Smoker    Smokeless tobacco: Never Used   Substance and Sexual Activity    Alcohol use: No    Drug use: No    Sexual activity: Not on file   Lifestyle    Physical activity:     Days per week: Not on file     Minutes per session: Not on file    Stress: Not on file   Relationships    Social connections:     Talks on phone: Not on file     Gets together: Not on file     Attends Confucianism service: Not on file     Active member of club or organization: Not on file     Attends meetings of clubs or organizations: Not on file     Relationship status: Not on file    Intimate partner violence:     Fear of current or ex partner: Not on file     Emotionally abused: Not on file     Physically abused: Not on file     Forced sexual activity: Not on file   Other Topics Concern    Not on file   Social History Narrative    Not on file      No Known Allergies   Current Outpatient Medications   Medication Sig    pioglitazone (ACTOS) 15 mg tablet Take 15 mg by mouth.  amLODIPine (NORVASC) 10 mg tablet TAKE 1 TABLET BY MOUTH ONCE DAILY    lisinopril (PRINIVIL, ZESTRIL) 2.5 mg tablet TAKE 1 TABLET BY MOUTH ONCE DAILY    pioglitazone-metFORMIN (ACTOPLUS MET)  mg per tablet Take 1 Tab by mouth two (2) times daily (with meals).  furosemide (LASIX) 20 mg tablet Take 1 Tab by mouth daily.  sildenafil citrate (VIAGRA) 100 mg tablet Take 1 Tab by mouth as needed.  aspirin 81 mg chewable tablet Take 81 mg by mouth daily. No current facility-administered medications for this visit.        PHYSICAL EXAMINATION:  Visit Vitals  /64 (BP 1 Location: Left arm, BP Patient Position: Sitting)   Pulse 66   Temp 95.9 °F (35.5 °C) (Oral)   Resp 16   Ht 5' 10\" (1.778 m)   Wt 214 lb 12.8 oz (97.4 kg)   SpO2 95%   BMI 30.82 kg/m²      ORTHO EXAMINATION:  Examination Right Hand Left Hand   Skin Intact Intact   Deformity - -   Swelling - -   Tenderness - -   Finger flexion Full unable to actively flex index finger   Finger extension Full Full   Sensation Normal Normal   Capillary refill Normal Normal   Heberden's nodes + +   Dupuytren's - -     Examination Right Wrist Left Wrist   Skin Intact Intact   Tenderness - -   Flexion 40 40   Extension 30 30   Deformity - -   Effusion - -   Finger flexion Full Full   Finger extension Full Full   Tinel's sign - -   Phalen's test - -   Finklestein maneuver - -   Pain with thumb abduction - -   Capillary refill - -   decreased sensation to light touch in the ulnar nerve distribution left hand  Left thenar atrophy      IMPRESSION:      ICD-10-CM ICD-9-CM    1. Rupture of flexor tendon of left hand, initial encounter S66.812A 842.10    2. Numbness and tingling in left hand R20.0 782. 0 EMG ONE EXTREMITY UPPER LT    R20.2  NCV/LAT MOTOR PER NERVE UP/LT     PLAN:  EMG/NCS ordered. He will follow up with Dr. Vandana Minaya for orthopedic hand surgery consultation.      Scribed by Gabriel Forrester (Ngoc Alvarado) as dictated by Samia Kaur MD

## 2019-11-13 ENCOUNTER — OFFICE VISIT (OUTPATIENT)
Dept: NEUROLOGY | Age: 84
End: 2019-11-13

## 2019-11-13 DIAGNOSIS — G54.0 LEFT BRACHIAL PLEXITIS: Primary | ICD-10-CM

## 2019-11-13 NOTE — LETTER
11/13/2019 1:04 PM 
 
Patient:  Shlomo Mckeon. YOB: 1935 Date of Visit: 11/13/2019 Dear Martha Bar MD 
[facility] [address] VIA In Basket 
 : Thank you for referring Mr. Camilo Curran to me for evaluation/treatment. Below are the relevant portions of my assessment and plan of care. If you have questions, please do not hesitate to call me. I look forward to following Mr. Jacki Payan along with you.  
 
 
 
Sincerely, 
 
 
Jayla Mcneil MD

## 2019-11-13 NOTE — PROGRESS NOTES
Jessica Rubin. is a 82 y.o., right handed male, with an established history of diabetes for 25 years, hypertension comes in with 6 months of left arm symptoms. States that he has developed cramping and pain starting in the shoulder radiating down into the hand. He has a sharp pain in the hand and in the shoulder. He denies any symptoms in the right side at all. He denies any numbness or tingling in the legs. Symptoms have not gotten any better since onset. He does have significant inability to use the left hand. Social History; patient is  lives with his wife. No alcohol no tobacco.  Retired . Current Outpatient Medications   Medication Sig Dispense Refill    pioglitazone (ACTOS) 15 mg tablet Take 15 mg by mouth.  amLODIPine (NORVASC) 10 mg tablet TAKE 1 TABLET BY MOUTH ONCE DAILY 30 Tab 0    lisinopril (PRINIVIL, ZESTRIL) 2.5 mg tablet TAKE 1 TABLET BY MOUTH ONCE DAILY 30 Tab 0    pioglitazone-metFORMIN (ACTOPLUS MET)  mg per tablet Take 1 Tab by mouth two (2) times daily (with meals). 60 Tab 6    furosemide (LASIX) 20 mg tablet Take 1 Tab by mouth daily. 30 Tab 6    sildenafil citrate (VIAGRA) 100 mg tablet Take 1 Tab by mouth as needed. 4 Each 3    aspirin 81 mg chewable tablet Take 81 mg by mouth daily.          Past Medical History:   Diagnosis Date    Asthma 1/11/2010    Diabetes ([facility] [address]     DJD (degenerative joint disease) 1/11/2010    DJD of shoulder     DM (diabetes mellitus) ([facility] [address] 1/11/2010    Essential hypertension, benign     HTN (hypertension), benign 1/11/2010    Hypercholesteremia 1/11/2010    Sleep apnea        Past Surgical History:   Procedure Laterality Date    HX CYST REMOVAL         No Known Allergies    Patient Active Problem List   Diagnosis Code    HTN (hypertension), benign I10    DM (diabetes mellitus) ([address] E11.9    DJD (degenerative joint disease) M19.90    Asthma J45.909    Hypercholesteremia E78.00    Sleep apnea G47.30 Review of Systems:   As above otherwise 11 point review of systems negative including;   Constitutional no fever or chills  Skin denies rash or itching  HENT  Denies tinnitus, hearing lose  Eyes denies diplopia vision lose  Respiratory denies shortness of breath  Cardiovascular denies chest pain, dyspnea on exertion  Gastrointestinal denies nausea, vomiting, diarrhea, constipation  Genitourinary denies incontinence  Musculoskeletal denies joint pain or swelling  Endocrine denies weight change  Hematology denies easy bruising or bleeding   Neurological as above in HPI      PHYSICAL EXAMINATION:      VITAL SIGNS:  There were no vitals taken for this visit. GENERAL: The patient is well developed, well nourished, and in no apparent distress. EXTREMITIES: No clubbing, cyanosis, or edema is identified. Pulses 2+ and symmetrical.  Muscle tone is normal.  HEAD:   Ear, nose, and throat appear to be without trauma. The patient is normocephalic. NEUROLOGIC EXAMINATION    MENTAL STATUS: The patient is awake, alert, and oriented x 4. Fund of knowledge is adequate. Speech is fluent and memory appears to be intact, both long and short term. CRANIAL NERVES: II - Visual fields are full to confrontation. Funduscopic examination reveals flat disks bilaterally. Pupils are both 2 mm and briskly reactive to light and accommodation. III, IV, VI - Extraocular movements are intact and there is no nystagmus. V - Facial sensation is intact to pinprick and light touch. VII - Face is symmetrical.   VIII - Hearing is present. IX, X, XII- Palate rises symmetrically. Gag is present. Tongue is in the midline. XI - Shoulder shrugging and head turning intact  MOTOR:  The patient has significant weakness of the left hand. He cannot oppose the thumb at all. He also has weakness of abduction of the fingers. Tone is normal.  Sensory examination is intact to pinprick, light touch and position sense testing.   Reflexes are 2+ and symmetrical. Plantars are down going. Cerebellar examination reveals no gross ataxia or dysmetria. Gait is normal.       CBC:   Lab Results   Component Value Date/Time    WBC 6.1 04/13/2019 10:45 AM    RBC 4.69 (L) 04/13/2019 10:45 AM    HGB 14.0 04/13/2019 10:45 AM    HCT 43.1 04/13/2019 10:45 AM    PLATELET 052 74/76/3274 10:45 AM     BMP:   Lab Results   Component Value Date/Time    Glucose 146 (H) 04/13/2019 10:45 AM    Sodium 144 04/13/2019 10:45 AM    Potassium 3.8 04/13/2019 10:45 AM    Chloride 107 04/13/2019 10:45 AM    CO2 30 04/13/2019 10:45 AM    BUN 20 (H) 04/13/2019 10:45 AM    Creatinine 1.05 04/13/2019 10:45 AM    Calcium 8.7 04/13/2019 10:45 AM     CMP:   Lab Results   Component Value Date/Time    Glucose 146 (H) 04/13/2019 10:45 AM    Sodium 144 04/13/2019 10:45 AM    Potassium 3.8 04/13/2019 10:45 AM    Chloride 107 04/13/2019 10:45 AM    CO2 30 04/13/2019 10:45 AM    BUN 20 (H) 04/13/2019 10:45 AM    Creatinine 1.05 04/13/2019 10:45 AM    Calcium 8.7 04/13/2019 10:45 AM    Anion gap 7 04/13/2019 10:45 AM    BUN/Creatinine ratio 19 04/13/2019 10:45 AM    Alk.  phosphatase 50 04/13/2019 10:45 AM    Protein, total 7.6 04/13/2019 10:45 AM    Albumin 3.8 04/13/2019 10:45 AM    Globulin 3.8 04/13/2019 10:45 AM    A-G Ratio 1.0 04/13/2019 10:45 AM     Coagulation: No results found for: PTP, INR, APTT, PTTT, INREXT  Cardiac markers:   Lab Results   Component Value Date/Time     04/13/2019 01:35 PM    CK-MB Index 1.4 04/13/2019 01:35 PM        [facility]  OFFICE PROCEDURE PROGRESS NOTE        Chart reviewed for the following:   Lacey Maynard MD, have reviewed the History, Physical and updated the Allergic reactions for [address] performed immediately prior to start of procedure:   Lacey Maynard MD, have performed the following reviews on Shlomo Mckeon. prior to the start of the procedure:            * Patient was identified by name and date of birth   * Agreement on procedure being performed was verified  * Risks and Benefits explained to the patient  * Procedure site verified and marked as necessary  * Patient was positioned for comfort  * Consent was signed and verified     Time: 12:59 PM    Date of procedure: 11/13/2019    Procedure performed by:  Fahad Fraser MD    Provider assisted by: Michael Cage MA    Patient assisted by: self    How tolerated by patient: tolerated the procedure well with no complications    Post Procedural Pain Scale: 0 - No Hurt    Comments: none    Patient: Erica Ocampo     ID: [id number] Physician: Lloyd Mc.  Kimberly Childress MD   Gender: Male Ref Phys: Fran Palacios MD   Handedness: Michael Cage     Study Date: November 13, 2019       Nerve Conduction Studies  Anti Sensory Summary Table     Stim Site NR Peak (ms) Norm Peak (ms) O-P Amp (µV) Norm O-P Amp Dist (cm) Darshan (m/s)   Left Median 2nd Digit Anti Sensory (2nd Digit)   Wrist    3.4 <3.5 4.5 >20 13.0 48.1   Site 2    3.6  5.1      Right Median 2nd Digit Anti Sensory (2nd Digit)   Wrist    3.7 <3.5 10.1 >20 13.0 41.9   Site 2    3.7  13.0      Left Ulnar Anti Sensory (5th Digit )   Wrist    4.1 <3.1 5.1 >17.0 11.0 37.9   Site 2    4.1  4.5      Right Ulnar Anti Sensory (5th Digit )   Wrist    4.1 <3.1 8.5 >17.0 11.0 33.3   Site 2    4.1  3.9        Motor Summary Table     Stim Site NR Onset (ms) Norm Onset (ms) O-P Amp (mV) Norm O-P Amp Dist (cm) Darshan (m/s) Norm Darshan (m/s)   Left Median Motor (Abd Poll Brev)   Wrist    4.7 <4.4 3.0 >4.0 22.0 36.1 >49   Elbow    10.8  1.5       Right Median Motor (Abd Poll Brev)   Wrist    3.8 <4.4 4.0 >4.0 25.0 62.5 >49   Elbow    7.8  5.9       Left Ulnar Motor (Abd Dig Minimi )   Wrist    3.5 <3.3 0.5 >6.0 23.0 38.3 >49   B Elbow    9.5  0.7  11.0 55.0 >50   A Elbow    11.5  0.6       Right Ulnar Motor (Abd Dig Minimi )   Wrist    3.4 <3.3 7.7 >6.0 22.0 56.4 >49   B Elbow    7.3  7.3  13.0 68.4 >50   A Elbow    9.2  7.1 EMG     Side Muscle Nerve Root Ins Act Fibs Psw Fasc Amp Dur Poly Recrt Int Millicent Mao Comment   Left Deltoid Axillary C5-6 Nml Nml Nml None Nml Nml 0 Nml Nml    Left Biceps Musculocut C5-6 Nml Nml Nml None Nml Nml 0 Nml Nml    Left 1stDorInt Ulnar C8-T1 Nml 1+ Nml None Decr Nml 0 Reduced 25%    Left Abd Poll Brev Median C8-T1 Nml 2+ 2+ None Decr Nml 0 Reduced 25%    Left Cervical Parasp Up Rami C1-3 Nml Nml Nml          Left Cervical Parasp Mid Rami C4-6 Nml Nml Nml          Left Cervical Parasp Low Rami C7-8 Nml Nml Nml            NCS/EMG FINDINGS:     Evaluation of the Left median motor nerve showed prolonged distal onset latency (4.7 ms), reduced amplitude (3.0 mV), and decreased conduction velocity (Elbow-Wrist, 36.1 m/s).  The Right median motor nerve was unremarkable.  The Left ulnar motor nerve showed prolonged distal onset latency (3.5 ms), reduced amplitude (0.5 mV), and decreased conduction velocity (B Elbow-Wrist, 38.3 m/s).  The Right ulnar motor nerve showed prolonged distal onset latency (3.4 ms).  The Left Median 2nd Digit sensory nerve showed reduced amplitude (4.5 µV) and decreased conduction velocity (Wrist-2nd Digit, 48.1 m/s).  The Right Median 2nd Digit sensory nerve showed prolonged distal peak latency (3.7 ms), reduced amplitude (10.1 µV), and decreased conduction velocity (Wrist-2nd Digit, 41.9 m/s).  The Left ulnar sensory and the Right ulnar sensory nerves showed prolonged distal peak latency (L4.1, R4.1 ms), reduced amplitude (L5.1, R8.5 µV), and decreased conduction velocity (Wrist-5th Digit, L37.9, R33.3 m/s). INTERPRETATION:       ___________________________  Sherbenson Brizuela. Makenzie Lopez MD          Waveforms:                      Impression: This was an abnormal nerve conduction and EMG study showing there to be signs of:     (1) diffuse denervation and decrease in compound motor action potentials in the entire left upper extremity.   This may be consistent with a brachial plexitis involving the left upper limb. No sign of compressive neuropathy was appreciated. (2) He also seems to have evidence for a diffuse sensory neuropathy. PLEASE NOTE:   This document has been produced using voice recognition software. Unrecognized errors in transcription may be present.

## 2020-01-22 ENCOUNTER — OFFICE VISIT (OUTPATIENT)
Dept: ORTHOPEDIC SURGERY | Facility: CLINIC | Age: 85
End: 2020-01-22

## 2020-01-22 VITALS
TEMPERATURE: 95.8 F | RESPIRATION RATE: 18 BRPM | DIASTOLIC BLOOD PRESSURE: 73 MMHG | HEART RATE: 82 BPM | SYSTOLIC BLOOD PRESSURE: 150 MMHG | OXYGEN SATURATION: 99 % | HEIGHT: 70 IN | BODY MASS INDEX: 30.69 KG/M2 | WEIGHT: 214.4 LBS

## 2020-01-22 DIAGNOSIS — M54.10 BRACHIAL NEURITIS OF LEFT UPPER EXTREMITY: Primary | ICD-10-CM

## 2020-01-22 NOTE — PROGRESS NOTES
Homa Whitehead is a 82 y.o. male right handed individual, not currently working. Worker's Compensation and legal considerations: not known. Vitals:    01/22/20 0856   BP: 150/73   Pulse: 82   Resp: 18   Temp: 95.8 °F (35.4 °C)   TempSrc: Oral   SpO2: 99%   Weight: 214 lb 6.4 oz (97.3 kg)   Height: 5' 10\" (1.778 m)   PainSc:   0 - No pain   PainLoc: Hand           Chief Complaint   Patient presents with    Hand Pain     Left         HPI: Patient comes in today as a referral regarding left upper extremity brachial neuritis as well as a possible tendon rupture in his index finger. He reports a few months ago having issues with weakness in his left upper extremity and difficulty moving his fingers. Date of onset: Approximately September 2019    Injury: No    Prior Treatment:  No    Numbness/ Tingling: No    ROS: Review of Systems - General ROS: negative  Respiratory ROS: no cough, shortness of breath, or wheezing  Cardiovascular ROS: no chest pain or dyspnea on exertion  Musculoskeletal ROS: positive for - pain in arm - left  Neurological ROS: positive for - weakness  Dermatological ROS: negative    Past Medical History:   Diagnosis Date    Asthma 1/11/2010    Diabetes ([facility] [address]     DJD (degenerative joint disease) 1/11/2010    DJD of shoulder     DM (diabetes mellitus) ([facility] [address] 1/11/2010    Essential hypertension, benign     HTN (hypertension), benign 1/11/2010    Hypercholesteremia 1/11/2010    Sleep apnea        Past Surgical History:   Procedure Laterality Date    HX CYST REMOVAL         Current Outpatient Medications   Medication Sig Dispense Refill    pioglitazone (ACTOS) 15 mg tablet Take 15 mg by mouth.  amLODIPine (NORVASC) 10 mg tablet TAKE 1 TABLET BY MOUTH ONCE DAILY 30 Tab 0    lisinopril (PRINIVIL, ZESTRIL) 2.5 mg tablet TAKE 1 TABLET BY MOUTH ONCE DAILY 30 Tab 0    pioglitazone-metFORMIN (ACTOPLUS MET)  mg per tablet Take 1 Tab by mouth two (2) times daily (with meals).  60 Tab 6    furosemide (LASIX) 20 mg tablet Take 1 Tab by mouth daily. 30 Tab 6    sildenafil citrate (VIAGRA) 100 mg tablet Take 1 Tab by mouth as needed. 4 Each 3    aspirin 81 mg chewable tablet Take 81 mg by mouth daily. No Known Allergies      PE:   There is no evidence for a flexor tendon injury to the index finger on the left as patient has flexion of the FDP and F DS on isolation. NEUROVASCULAR: There is weakness in all digits especially with flexion of the index finger. Examination L R Examination L R   Carpal Comp. - - Pronator Comp. - -   Carpal Tinel - - Pronator Tinel - -   Phalen's - - Pronator Stress - -   Cubital Comp. - - Guyon Comp. - -   Cubital Tinel - - Guyon Tinel - -   Elbow Hyperflexion - - Adson's - -   Spurling's - - SC Comp. - -   PCB Median abn - - SC Tinel - -   Radial Tinel - - IC Comp. - -   Digital Tinel - - IC Tinel - -   Radial 2-Pt WNL WNL Ulnar 2-Pt WNL WNL     Radial Pulse: 2+  Capillary Refill: < 2 sec  Shemar: Not Performed  Digital Shemar: Not Performed      Imaging:    Impression: This was an abnormal nerve conduction and EMG study showing there to be signs of:                 (1) diffuse denervation and decrease in compound motor action potentials in the entire left upper extremity. This may be consistent with a brachial plexitis involving the left upper limb. No sign of compressive neuropathy was appreciated.                   (2) He also seems to have evidence for a diffuse sensory neuropathy. ICD-10-CM ICD-9-CM    1. 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 Referral to Dr. Jones Amezcua for brachial neuritis.     Plan was reviewed with patient, who verbalized agreement and understanding of the plan

## 2020-01-27 ENCOUNTER — HOSPITAL ENCOUNTER (OUTPATIENT)
Dept: PHYSICAL THERAPY | Age: 85
Discharge: HOME | End: 2020-01-27
Payer: MEDICARE

## 2020-01-27 PROCEDURE — 97165 OT EVAL LOW COMPLEX 30 MIN: CPT

## 2020-01-27 NOTE — PROGRESS NOTES
OT DAILY TREATMENT NOTE 10-18    Patient Name: Mag Palacio. Date:2020  : 1935  [x]  Patient  Verified  Payor: [place] Plana / Plan: [facility] HUMANA MEDICARE CHOICE PPO/PFFS / Product Type: Managed Care Medicare /    In time:140  Out time:215  Total Treatment Time (min): 35  Visit #: 1 of 12    Medicare/BCBS Only   Total Timed Codes (min):  0 1:1 Treatment Time:  35     Treatment Area: Left arm pain [M79.602]    SUBJECTIVE  Pain Level (0-10 scale): 0/10  Any medication changes, allergies to medications, adverse drug reactions, diagnosis change, or new procedure performed?: [x] No    [] Yes (see summary sheet for update)  Subjective functional status/changes:   [] No changes reported  More numb than pain    OBJECTIVE    35 min []Eval                  []Re-Eval     Patient late to session and had not completed paperwork. Had to be guided through paperwork      With   [] TE   [] TA   [] neuro   [] other: Patient Education: [x] Review HEP    [] Progressed/Changed HEP based on: OT POC  [] positioning   [] body mechanics   [] transfers   [] heat/ice application   [] Splint wear/care   [] Sensory re-education   [] scar management      [] other:            Other Objective/Functional Measures:   Subjective: pt is a right hand dominant, 82 y.o.y/o, male who sustained his/her injury sudden onset of pain in left  Arm and hand elbow to hand following flu shot.   .   Prior level of function: , retired construciton , [employer], sports on [employer],  [employer], [email or web address]  Pain level:(0-no pain 10-debilitating pain) moderate    Description/Location: left arm and left hand with c/o minimal relief   Worst pain6/10 Least pain 4/10   Activities which aggravate pain: unknown   Activities which ease pain: meds  Current functional limitations/living situation: with wife, lifting with hand, opening jars and packages, cutting meat, tying shoes,     Medical hx: DM, arthrtis, HTN,     Medications: See the written copy of this report in the patient's paper medical record.        Objective:  Sensation:  Light Touch []WFL          [x] Impaired REduced light touch circumferentially from 4 inch distal to elbow   Sharp/Dull []WFL          [] Impaired NT   Pain/Temperature []WFL          [] Impaired NT   Range of Motion:WFL B  Strength:WFL  Hand ROM R/L  Clawing present left hand digits 3-5  Limited digit ab adduction , WNL flexion except index finger   Index        MP 75/65     CMC   /50     MP   DIP 30/15     IP         Velazco Abd         Radial Abd     Hand Strength:   Gross Grasp 3pt Pinch Lateral Pinch Tip Pinch   Right  80 19 21 14   Left 19 2 4 0     Nine-Hole Peg Test:  Left= __38___seconds  Right=__24___seconds  Finger Opposition: Limited opposition of 5th digit  Stereognosis: WNL    Palpation: REports tingly at wrist level, no point tenderness    ADLs  Feeding:        []MaxA   []ModA   [x]Elif   [] CGA   []SBA   []Kasi   []Independent  UE Dressing:       []MaxA   []ModA   []Elif   [] CGA   []SBA   []Kasi   [x]Independent  LE Dressing:       []MaxA   []ModA   []Elif   [] CGA   []SBA   []Kasi   [x]Independent  Grooming:       []MaxA   []ModA   []Elif   [] CGA   []SBA   []Kasi   [x]Independent  Toileting:       []MaxA   []ModA   []Elif   [] CGA   []SBA   []Kasi   [x]Independent  Bathing:       []MaxA   []ModA   []Elif   [] CGA   []SBA   []Kasi   [x]Independent  Light Meal Prep:    []MaxA   []ModA   [x]Elif   [] CGA   []SBA   []Kasi   []Independent  Household/Other: []MaxA   []ModA   [x]Elif   [] CGA   []SBA   []Kasi   []Independent  Adaptive Equip:     []MaxA   []ModA   []Elif   [] CGA   []SBA   []Kasi   []Independent  Driving:       []MaxA   []ModA   []Elif   [] CGA   []SBA   []Kasi   [x]Independent  Money Mgmt:        []MaxA   []ModA   []Elif   [] CGA   []SBA   []Kasi   [x]Independent       Pain Level (0-10 scale) post treatment: 0/10    ASSESSMENT/Changes in Function: *     [x]  See Plan of Care  []  See progress note/recertification  []  See Discharge Summary           PLAN  []  Upgrade activities as tolerated     []  Continue plan of care  []  Update interventions per flow sheet       []  Discharge due to:_  []  Other:_      Regis Pelayo OTR/L 1/27/2020  9:33 AM    Future Appointments   Date Time Provider Kodak Joseph   4/22/2020  9:10 AM DO Juju Sanders Darryl 71

## 2020-01-27 NOTE — PROGRESS NOTES
[facility] [employer] [facility]  [address] [facility]  [phone] fax    Plan of Care/Statement of Necessity for Occupational Therapy Services    Patient name: Kishan Sheffield. Start of Care: 2020   Referral source: Yazan Brizuela DO : 1935    Medical Diagnosis: Left arm pain [M79.602]  Payor: HUMANA MEDICARE / Plan: [facility] HUMANA MEDICARE CHOICE PPO/PFFS / Product Type: Managed Care Medicare /  Onset Date:    Treatment Diagnosis: Weakness left UE   Prior Hospitalization: see medical history Provider#: [id number]   Medications: Verified on Patient summary List    Comorbidities: DM, HTN, arthritis   Prior Level of Function: , yard work, I self care, driving, [employer], sports on TV          The Plan of Care and following information is based on the information from the initial evaluation. Assessment/ key information:  Patient is a right hand dominant, 82 y.o.y/o, male who sustained his/her injury sudden onset of pain, numbness and weakness  in left  UE elbow to hand following flu shot. He reports pain of 0/10 today but numbness from mid forearm distal to fingertips on both radial and ulnar side. Pain has been present at a level of 10/10. Proximal left  UE strength is WNL. He reports difficulty lifting, flexing index finger, gripping and carrying. He lacks intrinsic function in left hand  and is developing a claw hand in digits 3-5. Index finger flexion is MP 65, PIP 50 and DIP 15. His left  is 19#, pinches are 0-4#. He can complete 9 hole peg test but does not use index finger to  pegs. Stereognosis and touch localization is WNL. He will benenfit from skilled occupational therapy to reduce risk of deformity, improve hand function and return to usual daily tasks.       Evaluation Complexity: History LOW Complexity : Brief history review  Examination LOW Complexity : 1-3 performance deficits relating to physical, cognitive , or psychosocial skils that result in activity limitations and / or participation restrictions  Clinical Decision Making LOW Complexity : No comorbidities that affect functional and no verbal or physical assistance needed to complete eval tasks   Overall Complexity Rating: LOW     Problem List: Pain effecting function, Decreased range of motion, Decreased strength, Decreased coordination/prehension, Decreased ADL/functional abilities  and Sensability     Treatment Plan may include any combination of the following: Therapeutic exercise, Therapeutic activities, Physical agent/modality, Splinting/orthoses, Patient education and ADLs/IADLs    Patient / Family readiness to learn indicated by: asking questions, trying to perform skills and interest    Persons(s) to be included in education:   patient (P)    Barriers to Learning/Limitations: None    Patient Goal (s): Strength and mobility in fingers    Patient Self Reported Health Status: fair    Rehabilitation Potential: good    Short Term Goals: To be accomplished in 2 weeks:  1. Patient will be independent in home exercise program for intrinsic strengthening  2. Patient will be fitted with custom anti-claw hand orthosis to prevent deformity. 3.  Patient will be independent in sensory re-education techniques. 4.  Patient will be independent in home exercise program for hand strengthening. Long Term Goals: To be accomplished in 4 weeks:   1. Patient will increase right hand  at least 30# for opening jars at home/  2. Patient will achieve at least 10# pinch in one prehension pattern for opening packages. 3.  Patient will be able to perform 9 hole peg test with left hand with 20% improved speed incorporating index finger. 4.  Patient will report improved ability to lift and carry items in kitchen to increase functional independence.       Frequency / Duration: Patient to be seen 2-3 times per week for 4 weeks:    Patient/ Caregiver education and instruction: Diagnosis, prognosis, self care, activity modification, brace/ splint application and exercises  [x]  Plan of care has been reviewed with [facility]    Certification Period: 1/27/20-2/26/20    KULWINDER Hooks/L 1/27/2020 1:25 PM      ________________________________________________________________________    I certify that the above Therapy Services are being furnished while the patient is under my care. I agree with the treatment plan and certify that this therapy is necessary.     Physician's Signature:____________Date:_________TIME:________    ** Signature, Date and Time must be completed for valid certification **    Please sign and return to [facility] [employer] [facility]   [address] [place] [address]  [phone] fax

## 2020-03-19 ENCOUNTER — HOSPITAL ENCOUNTER (OUTPATIENT)
Dept: PHYSICAL THERAPY | Age: 85
Discharge: HOME | End: 2020-03-19
Payer: MEDICARE

## 2020-03-19 PROCEDURE — 97110 THERAPEUTIC EXERCISES: CPT

## 2020-03-19 PROCEDURE — 97530 THERAPEUTIC ACTIVITIES: CPT

## 2020-03-19 NOTE — PROGRESS NOTES
OT DAILY TREATMENT NOTE 10-18    Patient Name: Erica Lima   Date:3/19/2020  : 1935  [x]  Patient  Verified  Payor: [place] Pina / Plan: [facility] [employer] MEDICARE CHOICE PPO/PFFS / Product Type: Managed Care Medicare /    In time:1220  Out time:100  Total Treatment Time (min): 40  Visit #: 1 of 8    Medicare/BCBS Only   Total Timed Codes (min):  40 1:1 Treatment Time:  40     Treatment Area: Left arm pain [M79.602]    SUBJECTIVE  Pain Level (0-10 scale): 0/10  Any medication changes, allergies to medications, adverse drug reactions, diagnosis change, or new procedure performed?: [x] No    [] Yes (see summary sheet for update)  Subjective functional status/changes:   [] No changes reported  Get a prickly feelin now and again  I can't do much with  my index finger    OBJECTIVE        30 min Therapeutic Exercise:  [] See flow sheet :   Rationale: increase ROM and increase strength to improve the patients ability to grsp  Recheck strength, left UE, ROM digits left hand  Reviewed and completed putty HEP for intrinsics    10 min Therapeutic Activity:  []  See flow sheet :   Rationale: improve coordination and sensation  to improve the patients ability to use left hand  Recheck sensation and fine motor left hand         With   [] TE   [] TA   [] neuro   [] other: Patient Education: [x] Review HEP    [] Progressed/Changed HEP based on: progress made, putty HEP  [] positioning   [] body mechanics   [] transfers   [] heat/ice application   [] Splint wear/care   [] Sensory re-education   [] scar management      [] other:            Other Objective/Functional Measures:   Mild Clawing present left hand digits 3-5  Limited digit ab adduction , WNL flexion except index finger    Index  3/19/2020           MP 75/65  70       CMC   /50  75       MP   DIP 30/15  35       IP               Velazco Abd               Radial Abd      Hand Strength:Current measures below with prior in ( )    Gross Grasp 3pt Pinch Lateral Pinch Tip Pinch   Right  80 19 21 14   Left  25 (19)   5 (2) 4 (4)   2 (0)      Nine-Hole Peg Test:  Left= _33 (_38)___seconds                        Right=__24___seconds  Finger Opposition: Limited opposition of 5th digit, able to reach ot side 3/19/2020     3/19/2020 Impaired light touch at wrist crease and distal  FOTO 60  Pain Level (0-10 scale) post treatment: 0/10    ASSESSMENT/Changes in Function: Improved sensation, fine motor and /pinches. Patient will continue to benefit from skilled OT services to modify and progress therapeutic interventions, address ROM deficits, address strength deficits, analyze and cue movement patterns and instruct in home and community integration to attain remaining goals. []  See Plan of Care  []  See progress note/recertification  []  See Discharge Summary         Progress towards goals / Updated goals:  *1.  Patient will be independent in home exercise program for intrinsic strengthening  Status last visit: progressing with putty exercises and puff ball   2. Patient will be fitted with custom anti-claw hand orthosis to prevent deformity. Status last visit: does not appear warranted 3/19/20  3. Patient will be independent in sensory re-education techniques. 4.  Patient will be independent in home exercise program for hand strengthening. Status last visit: progressing with putty exercises     Long Term Goals: To be accomplished in 4 weeks:              1.  Patient will increase right hand  at least 30# for opening jars at home/  2. Patient will achieve at least 10# pinch in one prehension pattern for opening packages. 3.  Patient will be able to perform 9 hole peg test with left hand with 20% improved speed incorporating index finger.     4.  Patient will report improved ability to lift and carry items in kitchen to increase functional independence.     **    PLAN  []  Upgrade activities as tolerated     [x]  Continue plan of care  []  Update interventions per flow sheet       []  Discharge due to:_  []  Other:_      KULWINDER Barreto/L 3/19/2020  12:19 PM    Future Appointments   Date Time Provider Kodak Joseph   4/22/2020  9:10 AM DO Tia Schmitt

## 2020-03-19 NOTE — PROGRESS NOTES
[facility]  [place]  [address] [place] [address]  [phone] fax    Continued Plan of Care/ Re-certification for Occupational Therapy Services    Patient name: Estela Seen. Start of Care: 2020   Referral source: Monique Zapata DO : 1935   Medical/Treatment Diagnosis: Left arm pain [M79.602]  Payor: HUMANA MEDICARE / Plan: [facility] HUMANA MEDICARE CHOICE PPO/PFFS / Product Type: Managed Care Medicare /  Onset Date:2019     Prior Hospitalization: see medical history Provider#: [id number]   Medications: Verified on Patient Summary List    Comorbidities: Dm, HTN, arthritis  Prior Level of Function:, retired heavy equipment, I self care driving, yard care, [employer]  Visits from [place] of Care: 2    Missed Visits: 0  patient had gap in care between eval and this visit due to insurance and scheduling issues. The Plan of Care and following information is based on the patient's current status:    index finger  Left    Index Left  3/19/2020           MP /65  70          PIP /50  75          DIP /15  35                                         Current measures listed below with prior in ( )   25 (was 19)  3 point 5 (was 2)  Lat 4 (4)  Tip 2 (0)    9 hole 33 (38)  Sensation now intact to light touch to wrist level      1.  Patient will be independent in home exercise program for intrinsic strengthening  Status at [facility]: did not have  Current Status : progressing with putty exercises and puff ball     2.  Patient will be fitted with custom anti-claw hand orthosis to prevent deformity. Status at [facility]: Did not have  Current Status: does not appear warranted 3/19/20    3.  Patient will be independent in sensory re-education techniques. Status at [facility]: did not have  Status last visit: Not addressed    4.  Patient will be independent in home exercise program for hand strengthening.   Status at [facility]: did not have  Current Status l progressing with putty exercises     Long Term Goals: To be accomplished in 4 weeks:              1.  Patient will increase right hand  at least 30# for opening jars at home/  Status at eval :  19#  Current Status l: Progressing, now 25#    2.  Patient will achieve at least 10# pinch in one prehension pattern for opening packages. Status at eval: Pinches 0-4#  Current status: progressing, now 2-5#    3.  Patient will be able to perform 9 hole peg test with left hand with 20% improved speed incorporating index finger. Status at eval: 9 hole 38 without use of index   Current status: Progresing, now 35 with some use of index    4.  Patient will report improved ability to lift and carry items in kitchen to increase functional independence. Status at eval: Unable  Current status: progressing       Key functional changes: Improved sensation, digit ROM,  and pinches      Problems/ barriers to goal attainment: missed visits     Treatment Plan: Therapeutic exercise, Therapeutic activities, Physical agent/modality, Patient education and ADLs/IADLs      Patient Goal (s) has been updated and includes: Improve hand use     Goals for this certification period to be accomplished in 4 weeks:  *1.  Patient will be independent in home exercise program for intrinsic strengthening  3.  Patient will be independent in sensory re-education techniques. 4.  Patient will be independent in home exercise program for hand strengthening. LTGs  1.  Patient will increase right hand  at least 30# for opening jars at home/  2.  Patient will achieve at least 10# pinch in one prehension pattern for opening packages. 3.  Patient will be able to perform 9 hole peg test with left hand with 20% improved speed incorporating index finger. 4.  Patient will report improved ability to lift and carry items in kitchen to increase functional independence.      Frequency / Duration: Patient to be seen 2 times per week for 4 weeks:    Assessment / Recommendations:Patient will benefit from continued skilled occupational therapy to increase upper extremity function and functional independence. Certification Period: 3/19/2020-4/17/2020    EMILY Das 3/19/2020 1:51 PM    ________________________________________________________________________  I certify that the above Therapy Services are being furnished while the patient is under my care. I agree with the treatment plan and certify that this therapy is necessary.       Physician's Signature:____________Date:_________TIME:________    ** Signature, Date and Time must be completed for valid certification **      Please sign and return to [facility]  Ananya Kelsey  [address] [place] [address]            [phone] fax

## 2020-03-24 ENCOUNTER — APPOINTMENT (OUTPATIENT)
Dept: PHYSICAL THERAPY | Age: 85
End: 2020-03-24
Payer: MEDICARE

## 2020-03-26 ENCOUNTER — APPOINTMENT (OUTPATIENT)
Dept: PHYSICAL THERAPY | Age: 85
End: 2020-03-26
Payer: MEDICARE

## 2020-07-25 ENCOUNTER — TELEPHONE ENCOUNTER (OUTPATIENT)
Dept: URBAN - METROPOLITAN AREA CLINIC 13 | Facility: CLINIC | Age: 85
End: 2020-07-25

## 2020-07-26 ENCOUNTER — TELEPHONE ENCOUNTER (OUTPATIENT)
Dept: URBAN - METROPOLITAN AREA CLINIC 13 | Facility: CLINIC | Age: 85
End: 2020-07-26

## 2020-07-26 RX ORDER — ETODOLAC 400 MG/1
TAKE  TABLET TABLET, FILM COATED ORAL
Refills: 0 | Status: ACTIVE | COMMUNITY
Start: 2008-06-04

## 2020-07-26 RX ORDER — AMLODIPINE BESYLATE 5 MG
TABLET ORAL
Refills: 0 | Status: ACTIVE | COMMUNITY
Start: 2008-06-04

## 2020-07-26 RX ORDER — SIMVASTATIN 80 MG/1
TABLET, FILM COATED ORAL
Refills: 0 | Status: ACTIVE | COMMUNITY
Start: 2008-06-04

## 2020-07-26 RX ORDER — SILDENAFIL CITRATE 100 MG/1
TABLET, FILM COATED ORAL
Refills: 0 | Status: ACTIVE | COMMUNITY
Start: 2008-06-04

## 2020-07-26 RX ORDER — PIOGLITAZONE AND METFORMIN HYDROCHLORIDE 15; 850 MG/1; MG/1
TABLET, FILM COATED ORAL
Refills: 0 | Status: ACTIVE | COMMUNITY
Start: 2008-06-04

## 2020-09-10 NOTE — PROGRESS NOTES
[facility] Zoë Galicia  [address] [facility]  [phone] fax    Occupational Therapy Discharge Summary  Patient name: Orlando Higuera. Start of Care: 2020   Referral source: Keegan RobertsDO : 1935   Medical/Treatment Diagnosis: Left arm pain [M79.602]  Payor: HUMANA MEDICARE / Plan: [facility] HUMANA MEDICARE CHOICE PPO/PFFS / Product Type: Managed Care Medicare /  Onset Date:2019     Prior Hospitalization: see medical history Provider#: [id number]   Medications: Verified on Patient Summary List    Comorbidities: DM, HTN, arthritis  Prior Level of Function:, retired heavy equipment, I slef care driving [facility]    Visits from [place] of Care: 2    Missed Visits: 0    Reporting Period : 3/19/2020 to 3/19/2020    Summary of Care:patient not seen since last progress note. No change in status. 1.  Patient will be independent in home exercise program for intrinsic strengthening  Status at eval: did not have  Discharge  Status : Not metprogressing with putty exercises and puff ball      2.  Patient will be fitted with custom anti-claw hand orthosis to prevent deformity. Status at eval: Did not have  Discharge  Status: Not met does not appear warranted 3/19/20     3.  Patient will be independent in sensory re-education techniques. Status at eval: did not have  Discharge status; Not met: Not addressed     4.  Patient will be independent in home exercise program for hand strengthening. Status at eval: did not have  Discharge  Status lNot met,  progressing with putty exercises     Long Term Goals: To be accomplished in 4 weeks:              1.  Patient will increase right hand  at least 30# for opening jars at home/  Status at eval :  19#  Discharge status: Not met: Progressing, now 25#     2.  Patient will achieve at least 10# pinch in one prehension pattern for opening packages.   Status at eval: Pinches 0-4#  Discharge  status: Not met, progressing, now 2-5#     3.  Patient will be able to perform 9 hole peg test with left hand with 20% improved speed incorporating index finger. Status at eval: 9 hole 38 without use of index   Discharge  status: Not met, Progresing, now 35 with some use of index     4.  Patient will report improved ability to lift and carry items in kitchen to increase functional independence. Status at eval: Unable  Discharge  status: Not met progressing     ASSESSMENT/Changes in Function: goals not met due to poor attendance.       ASSESSMENT/RECOMMENDATIONS:  [x]Discontinue therapy: []Patient has reached or is progressing toward set goals      [x]Patient is non-compliant or has abdicated      []Due to lack of appreciable progress towards set goals    Tolbert Paget, OTR/L 9/10/2020 10:57 AM

## 2021-09-05 ENCOUNTER — APPOINTMENT (OUTPATIENT)
Dept: GENERAL RADIOLOGY | Age: 86
End: 2021-09-05
Attending: PHYSICIAN ASSISTANT
Payer: MEDICARE

## 2021-09-05 ENCOUNTER — HOSPITAL ENCOUNTER (EMERGENCY)
Age: 86
Discharge: HOME | End: 2021-09-05
Attending: STUDENT IN AN ORGANIZED HEALTH CARE EDUCATION/TRAINING PROGRAM
Payer: MEDICARE

## 2021-09-05 VITALS
HEIGHT: 68 IN | OXYGEN SATURATION: 96 % | DIASTOLIC BLOOD PRESSURE: 79 MMHG | BODY MASS INDEX: 30.31 KG/M2 | SYSTOLIC BLOOD PRESSURE: 141 MMHG | WEIGHT: 200 LBS | HEART RATE: 108 BPM | TEMPERATURE: 98.7 F | RESPIRATION RATE: 20 BRPM

## 2021-09-05 DIAGNOSIS — J06.9 VIRAL URI WITH COUGH: Primary | ICD-10-CM

## 2021-09-05 DIAGNOSIS — Z20.822 SUSPECTED COVID-19 VIRUS INFECTION: ICD-10-CM

## 2021-09-05 LAB
ALBUMIN SERPL-MCNC: 3.4 G/DL (ref 3.4–5)
ALBUMIN/GLOB SERPL: 1 {RATIO} (ref 0.8–1.7)
ALP SERPL-CCNC: 43 U/L (ref 45–117)
ALT SERPL-CCNC: 20 U/L (ref 16–61)
ANION GAP SERPL CALC-SCNC: 7 MMOL/L (ref 3–18)
AST SERPL-CCNC: 20 U/L (ref 10–38)
BASOPHILS # BLD: 0 K/UL (ref 0–0.1)
BASOPHILS NFR BLD: 0 % (ref 0–2)
BILIRUB SERPL-MCNC: 1 MG/DL (ref 0.2–1)
BUN SERPL-MCNC: 11 MG/DL (ref 7–18)
BUN/CREAT SERPL: 10 (ref 12–20)
CALCIUM SERPL-MCNC: 8.9 MG/DL (ref 8.5–10.1)
CHLORIDE SERPL-SCNC: 100 MMOL/L (ref 100–111)
CO2 SERPL-SCNC: 30 MMOL/L (ref 21–32)
CREAT SERPL-MCNC: 1.11 MG/DL (ref 0.6–1.3)
DIFFERENTIAL METHOD BLD: ABNORMAL
EOSINOPHIL # BLD: 0 K/UL (ref 0–0.4)
EOSINOPHIL NFR BLD: 0 % (ref 0–5)
ERYTHROCYTE [DISTWIDTH] IN BLOOD BY AUTOMATED COUNT: 12.1 % (ref 11.6–14.5)
GLOBULIN SER CALC-MCNC: 3.4 G/DL (ref 2–4)
GLUCOSE SERPL-MCNC: 245 MG/DL (ref 74–99)
HCT VFR BLD AUTO: 36.3 % (ref 36–48)
HGB BLD-MCNC: 11.5 G/DL (ref 13–16)
LYMPHOCYTES # BLD: 1.2 K/UL (ref 0.9–3.6)
LYMPHOCYTES NFR BLD: 24 % (ref 21–52)
MCH RBC QN AUTO: 28.8 PG (ref 24–34)
MCHC RBC AUTO-ENTMCNC: 31.7 G/DL (ref 31–37)
MCV RBC AUTO: 91 FL (ref 78–100)
MONOCYTES # BLD: 0.6 K/UL (ref 0.05–1.2)
MONOCYTES NFR BLD: 11 % (ref 3–10)
NEUTS SEG # BLD: 3.2 K/UL (ref 1.8–8)
NEUTS SEG NFR BLD: 63 % (ref 40–73)
PLATELET # BLD AUTO: 211 K/UL (ref 135–420)
PMV BLD AUTO: 9.5 FL (ref 9.2–11.8)
POTASSIUM SERPL-SCNC: 4 MMOL/L (ref 3.5–5.5)
PROT SERPL-MCNC: 6.8 G/DL (ref 6.4–8.2)
RBC # BLD AUTO: 3.99 M/UL (ref 4.35–5.65)
SODIUM SERPL-SCNC: 137 MMOL/L (ref 136–145)
WBC # BLD AUTO: 5.1 K/UL (ref 4.6–13.2)

## 2021-09-05 PROCEDURE — U0003 INFECTIOUS AGENT DETECTION BY NUCLEIC ACID (DNA OR RNA); SEVERE ACUTE RESPIRATORY SYNDROME CORONAVIRUS 2 (SARS-COV-2) (CORONAVIRUS DISEASE [COVID-19]), AMPLIFIED PROBE TECHNIQUE, MAKING USE OF HIGH THROUGHPUT TECHNOLOGIES AS DESCRIBED BY CMS-2020-01-R: HCPCS

## 2021-09-05 PROCEDURE — 85025 COMPLETE CBC W/AUTO DIFF WBC: CPT

## 2021-09-05 PROCEDURE — 99282 EMERGENCY DEPT VISIT SF MDM: CPT

## 2021-09-05 PROCEDURE — 80053 COMPREHEN METABOLIC PANEL: CPT

## 2021-09-05 PROCEDURE — 71045 X-RAY EXAM CHEST 1 VIEW: CPT

## 2021-09-05 NOTE — DISCHARGE INSTRUCTIONS
Continue your diabetes medication.   Drink plenty of fluids and get rest.  Return to the emergency department if you experience any worsening symptoms

## 2021-09-05 NOTE — ED NOTES
I performed a brief history of the patient here in triage and I have determined that pt will need further treatment and evaluation from the main side ER physician. I have placed initial orders based on the history to help in expediting patients care.      FABY London

## 2021-09-05 NOTE — ED TRIAGE NOTES
Patient c/o productive cough with yellow sputum since yesterday. C/o sore throat. Patient states that he is fully vaccinated with Pfizer Covid vaccine.

## 2021-09-05 NOTE — ED PROVIDER NOTES
EMERGENCY DEPARTMENT HISTORY AND PHYSICAL EXAM    I have evaluated the patient at 2:41 PM      Date: 9/5/2021  Patient Name: Flavia Agudelo. History of Presenting Illness     Chief Complaint   Patient presents with    Cough    Sore Throat         History Provided By: Patient  Location/Duration/Severity/Modifying factors   28-year-old male presenting to the emergency department for evaluation of viral upper respiratory symptoms for the past 2 to 3 days. Patient reports a productive cough of yellow sputum. He complains of nasal congestion and of a sore throat. He states he is fully vaccinated against Covid. He denies of any recent Covid exposures. He denies chest pain, shortness of breath, abdominal pain, nausea, vomiting, diarrhea. PCP: Other, MD Siria    Current Outpatient Medications   Medication Sig Dispense Refill    amLODIPine (NORVASC) 10 mg tablet TAKE 1 TABLET BY MOUTH ONCE DAILY 30 Tab 0    lisinopril (PRINIVIL, ZESTRIL) 2.5 mg tablet TAKE 1 TABLET BY MOUTH ONCE DAILY 30 Tab 0    pioglitazone-metFORMIN (ACTOPLUS MET)  mg per tablet Take 1 Tab by mouth two (2) times daily (with meals). 60 Tab 6    furosemide (LASIX) 20 mg tablet Take 1 Tab by mouth daily. 30 Tab 6    aspirin 81 mg chewable tablet Take 81 mg by mouth daily.          Past History     Past Medical History:  Past Medical History:   Diagnosis Date    Asthma 1/11/2010    Diabetes ([address]     DJD (degenerative joint disease) 1/11/2010    DJD of shoulder     DM (diabetes mellitus) ([address] 1/11/2010    Essential hypertension, benign     HTN (hypertension), benign 1/11/2010    Hypercholesteremia 1/11/2010    Sleep apnea        Past Surgical History:  Past Surgical History:   Procedure Laterality Date    HX CYST REMOVAL         Family History:  Family History   Problem Relation Age of Onset    Diabetes Sister     Lung Disease Brother     Diabetes Brother        Social History:  Social History     Tobacco Use  Smoking status: Never Smoker    Smokeless tobacco: Never Used   Substance Use Topics    Alcohol use: No    Drug use: No       Allergies:  No Known Allergies      Review of Systems       Review of Systems   Constitutional: Positive for fatigue. Negative for activity change, chills, diaphoresis and fever. HENT: Positive for congestion, sinus pain and sore throat. Negative for ear pain. Eyes: Negative for photophobia, pain and visual disturbance. Respiratory: Positive for cough. Negative for chest tightness, shortness of breath, wheezing and stridor. Cardiovascular: Negative for chest pain and palpitations. Gastrointestinal: Negative for abdominal distention, abdominal pain, constipation, diarrhea, nausea and vomiting. Genitourinary: Negative for difficulty urinating, dysuria and hematuria. Musculoskeletal: Positive for myalgias. Negative for back pain and joint swelling. Skin: Negative for rash. Neurological: Negative for dizziness, weakness and headaches. Psychiatric/Behavioral: Negative for agitation. The patient is not nervous/anxious. Physical Exam     Visit Vitals  BP (!) 141/79 (BP 1 Location: Left upper arm, BP Patient Position: At rest)   Pulse (!) 108   Temp 98.7 °F (37.1 °C)   Resp 20   Ht 5' 8\" (1.727 m)   Wt 90.7 kg (200 lb)   SpO2 96%   BMI 30.41 kg/m²         Physical Exam  Constitutional:       General: He is not in acute distress. Appearance: He is not toxic-appearing. HENT:      Head: Normocephalic and atraumatic. Mouth/Throat:      Mouth: Mucous membranes are moist.   Eyes:      Extraocular Movements: Extraocular movements intact. Pupils: Pupils are equal, round, and reactive to light. Cardiovascular:      Rate and Rhythm: Normal rate and regular rhythm. Heart sounds: Normal heart sounds. No murmur heard. No friction rub. No gallop. Pulmonary:      Effort: Pulmonary effort is normal.      Breath sounds: Normal breath sounds. Abdominal:      General: There is no distension. Palpations: Abdomen is soft. There is no mass. Tenderness: There is no abdominal tenderness. There is no guarding. Hernia: No hernia is present. Musculoskeletal:         General: No swelling, tenderness or deformity. Cervical back: Normal range of motion and neck supple. Skin:     General: Skin is warm and dry. Findings: No rash. Neurological:      General: No focal deficit present. Mental Status: He is alert and oriented to person, place, and time. Psychiatric:         Mood and Affect: Mood normal.           Diagnostic Study Results     Labs -  Recent Results (from the past 12 hour(s))   CBC WITH AUTOMATED DIFF    Collection Time: 09/05/21  2:45 PM   Result Value Ref Range    WBC 5.1 4.6 - 13.2 K/uL    RBC 3.99 (L) 4.35 - 5.65 M/uL    HGB 11.5 (L) 13.0 - 16.0 g/dL    HCT 36.3 36.0 - 48.0 %    MCV 91.0 78.0 - 100.0 FL    MCH 28.8 24.0 - 34.0 PG    MCHC 31.7 31.0 - 37.0 g/dL    RDW 12.1 11.6 - 14.5 %    PLATELET 008 347 - 413 K/uL    MPV 9.5 9.2 - 11.8 FL    NEUTROPHILS 63 40 - 73 %    LYMPHOCYTES 24 21 - 52 %    MONOCYTES 11 (H) 3 - 10 %    EOSINOPHILS 0 0 - 5 %    BASOPHILS 0 0 - 2 %    ABS. NEUTROPHILS 3.2 1.8 - 8.0 K/UL    ABS. LYMPHOCYTES 1.2 0.9 - 3.6 K/UL    ABS. MONOCYTES 0.6 0.05 - 1.2 K/UL    ABS. EOSINOPHILS 0.0 0.0 - 0.4 K/UL    ABS.  BASOPHILS 0.0 0.0 - 0.1 K/UL    DF AUTOMATED     METABOLIC PANEL, COMPREHENSIVE    Collection Time: 09/05/21  2:45 PM   Result Value Ref Range    Sodium 137 136 - 145 mmol/L    Potassium 4.0 3.5 - 5.5 mmol/L    Chloride 100 100 - 111 mmol/L    CO2 30 21 - 32 mmol/L    Anion gap 7 3.0 - 18 mmol/L    Glucose 245 (H) 74 - 99 mg/dL    BUN 11 7.0 - 18 MG/DL    Creatinine 1.11 0.6 - 1.3 MG/DL    BUN/Creatinine ratio 10 (L) 12 - 20      GFR est AA >60 >60 ml/min/1.73m2    GFR est non-AA >60 >60 ml/min/1.73m2    Calcium 8.9 8.5 - 10.1 MG/DL    Bilirubin, total 1.0 0.2 - 1.0 MG/DL    ALT (SGPT) 20 16 - 61 U/L    AST (SGOT) 20 10 - 38 U/L    Alk. phosphatase 43 (L) 45 - 117 U/L    Protein, total 6.8 6.4 - 8.2 g/dL    Albumin 3.4 3.4 - 5.0 g/dL    Globulin 3.4 2.0 - 4.0 g/dL    A-G Ratio 1.0 0.8 - 1.7         Radiologic Studies -   XR CHEST PORT   Final Result      Stable exam. No new, acute findings. Medical Decision Making   I am the first provider for this patient. I reviewed the vital signs, available nursing notes, past medical history, past surgical history, family history and social history. Vital Signs-Reviewed the patient's vital signs. Records Reviewed: Nursing Notes and Old Medical Records (Time of Review: 2:41 PM)    ED Course: Progress Notes, Reevaluation, and Consults:         Provider Notes (Medical Decision Making):   MDM  Number of Diagnoses or Management Options  Diagnosis management comments: 68-year-old male presenting to the emergency department for evaluation of constellation of viral upper respiratory symptoms. Tachycardic with a heart rate of 108. Otherwise hemodynamically stable. Saturating 6% on room air. Afebrile. Heart sounds are regular lung sounds are clear. Plan screening lab work and chest x-ray. Patient has been swabbed for Covid. 1537:  Patient remains stable. Heart rate has normalized now at 80 bpm.  Blood work demonstrates hyperglycemia with blood sugar of 245 but is otherwise unremarkable. Chest x-ray is clear. Plan for discharge home. Patient has been instructed on conservative measures for viral illness. Patient lives with family who is able to take care of him home. Discussed follow-up with primary care doctor. Discussed ED return precautions. Patient verbalizes understanding and agreement of plan. Diagnosis     Clinical Impression:   1. Viral URI with cough    2.  Suspected COVID-19 virus infection        Disposition: home    Follow-up Information     Follow up With Specialties Details Why Contact Info    [facility] EMERGENCY DEPT Emergency Medicine  As needed, If symptoms worsen [address] [facility]  [phone]    your primary care doctor               Patient's Medications   Start Taking    No medications on file   Continue Taking    AMLODIPINE (NORVASC) 10 MG TABLET    TAKE 1 TABLET BY MOUTH ONCE DAILY    ASPIRIN 81 MG CHEWABLE TABLET    Take 81 mg by mouth daily. FUROSEMIDE (LASIX) 20 MG TABLET    Take 1 Tab by mouth daily. LISINOPRIL (PRINIVIL, ZESTRIL) 2.5 MG TABLET    TAKE 1 TABLET BY MOUTH ONCE DAILY    PIOGLITAZONE-METFORMIN (ACTOPLUS MET)  MG PER TABLET    Take 1 Tab by mouth two (2) times daily (with meals). These Medications have changed    No medications on file   Stop Taking    PIOGLITAZONE (ACTOS) 15 MG TABLET    Take 15 mg by mouth. SILDENAFIL CITRATE (VIAGRA) 100 MG TABLET    Take 1 Tab by mouth as needed. Disclaimer: Sections of this note are dictated using utilizing voice recognition software. Minor typographical errors may be present. If questions arise, please do not hesitate to contact me or call our department. no

## 2021-09-07 ENCOUNTER — PATIENT OUTREACH (OUTPATIENT)
Dept: CASE MANAGEMENT | Age: 86
End: 2021-09-07

## 2021-09-07 LAB — SARS-COV-2, COV2NT: NOT DETECTED

## 2021-09-07 NOTE — PROGRESS NOTES
Patient contacted regarding COVID-19 test. Discussed COVID-19 related testing which was pending at this time. Test results were pending. Patient informed of results, if available? pending. LPN Care Coordinator contacted the patient by telephone to perform post discharge assessment. Call within 2 business days of discharge: Yes Verified name and  with patient as identifiers. Provided introduction to self, and explanation of the LPN CC role, and reason for call due to risk factors for infection and/or exposure to COVID-19. Symptoms reviewed with patient who verbalized the following symptoms: no new symptoms and no worsening symptoms  Spoke with patient. States he isn't feeling good. Patient couldn't pinpoint what exactly is wrong. Denies any sob. Advised to seek medical attention if symptoms worsens. Patient verbalizes understanding. Due to no new or worsening symptoms encounter was not routed to provider for escalation. Discussed follow-up appointments. If no appointment was previously scheduled, appointment scheduling offered:  no. [facility] follow up appointment(s): No future appointments. Non-[facility] follow up appointment(s): pcp as needed       Advance Care Planning:   Does patient have an Advance Directive: healthcare decision maker on file. LPN CC reviewed discharge instructions, medical action plan and red flag symptoms with the patient who verbalized understanding. COVID vaccination status: yes. Discussed exposure protocols and quarantine with CDC Guidelines. Patient was given an opportunity to verbalize any questions and concerns and agrees to contact LPN CC or health care provider for questions related to their healthcare. Reviewed and educated patient on any new and changed medications related to discharge diagnosis     Was patient discharged with a pulse oximeter? no  LPN CC provided contact information. Plan for follow-up call in 1-2 days based on severity of symptoms and risk factors.

## 2021-09-08 ENCOUNTER — PATIENT OUTREACH (OUTPATIENT)
Dept: CASE MANAGEMENT | Age: 86
End: 2021-09-08

## 2021-09-08 NOTE — PROGRESS NOTES
Date/Time:  9/8/2021 1:50 PM   Call within 2 business days of discharge: Yes   Attempted to reach patient by telephone. Left HIPPA compliant message requesting a return call. Will attempt to reach patient again. Covid test negative.

## 2021-09-13 ENCOUNTER — TRANSCRIBE ORDER (OUTPATIENT)
Dept: SCHEDULING | Age: 86
End: 2021-09-13

## 2021-09-13 DIAGNOSIS — R55 SYNCOPE: Primary | ICD-10-CM

## 2021-09-15 ENCOUNTER — PATIENT OUTREACH (OUTPATIENT)
Dept: CASE MANAGEMENT | Age: 86
End: 2021-09-15

## 2021-09-20 ENCOUNTER — HOSPITAL ENCOUNTER (OUTPATIENT)
Dept: CT IMAGING | Age: 86
Discharge: HOME | End: 2021-09-20
Attending: FAMILY MEDICINE
Payer: MEDICARE

## 2021-09-20 DIAGNOSIS — R55 SYNCOPE: ICD-10-CM

## 2021-09-20 PROCEDURE — 70450 CT HEAD/BRAIN W/O DYE: CPT

## 2023-04-07 ENCOUNTER — TRANSCRIBE ORDERS (OUTPATIENT)
Facility: HOSPITAL | Age: 88
End: 2023-04-07

## 2023-04-07 DIAGNOSIS — R39.9 LOWER URINARY TRACT SYMPTOMS (LUTS): Primary | ICD-10-CM

## 2023-04-17 ENCOUNTER — HOSPITAL ENCOUNTER (OUTPATIENT)
Facility: HOSPITAL | Age: 88
Discharge: HOME | End: 2023-04-20
Payer: MEDICARE

## 2023-04-17 DIAGNOSIS — R39.9 LOWER URINARY TRACT SYMPTOMS (LUTS): ICD-10-CM

## 2023-04-17 PROCEDURE — 76770 US EXAM ABDO BACK WALL COMP: CPT

## 2024-08-09 ENCOUNTER — HOSPITAL ENCOUNTER (EMERGENCY)
Facility: HOSPITAL | Age: 89
Discharge: HOME | End: 2024-08-09
Attending: STUDENT IN AN ORGANIZED HEALTH CARE EDUCATION/TRAINING PROGRAM
Payer: MEDICARE

## 2024-08-09 VITALS
DIASTOLIC BLOOD PRESSURE: 70 MMHG | HEIGHT: 68 IN | TEMPERATURE: 98 F | BODY MASS INDEX: 31.37 KG/M2 | WEIGHT: 207 LBS | SYSTOLIC BLOOD PRESSURE: 140 MMHG | HEART RATE: 80 BPM | RESPIRATION RATE: 16 BRPM | OXYGEN SATURATION: 100 %

## 2024-08-09 DIAGNOSIS — M54.12 CERVICAL RADICULOPATHY: Primary | ICD-10-CM

## 2024-08-09 PROCEDURE — 99283 EMERGENCY DEPT VISIT LOW MDM: CPT

## 2024-08-09 RX ORDER — IBUPROFEN 600 MG/1
600 TABLET ORAL 4 TIMES DAILY PRN
Qty: 40 TABLET | Refills: 0 | Status: SHIPPED | OUTPATIENT
Start: 2024-08-09

## 2024-08-09 ASSESSMENT — PAIN - FUNCTIONAL ASSESSMENT
PAIN_FUNCTIONAL_ASSESSMENT: 0-10
PAIN_FUNCTIONAL_ASSESSMENT: 0-10

## 2024-08-09 ASSESSMENT — PAIN SCALES - GENERAL
PAINLEVEL_OUTOF10: 6
PAINLEVEL_OUTOF10: 9

## 2024-08-09 NOTE — ED TRIAGE NOTES
Patient arrived to ER with complaints of right arm pain and shoulder pain, states he was given muscle relaxer's and its not helping. Pain has been for past 3 weeks.

## 2024-08-09 NOTE — ED PROVIDER NOTES
EMERGENCY DEPARTMENT HISTORY AND PHYSICAL EXAM      Date: 8/9/2024  Patient Name: Roberto Cao Jr.    History of Presenting Illness     Chief Complaint   Patient presents with    Arm Pain    Shoulder Pain       History (Context): Roberto Cao Jr. is a 88 y.o. male  presents to the ED today with chief complaint of right arm pain and shoulder pain  3 weeks.  States he was given muscle relaxers by his PCP and it is not helping. Patient works as a  and states PCP believed his pain was d/t muscle overuse. Has been a  since 2013 and is R handed. Ice/hot packs and warm compresses help pain. Has also tried 500 mg of Ibuprofen and voltaren gel which  has provided no relief. Denies any hx of kidney disease. Reports compliance w/ HTN and Diabetes meds.  this morning and which is around what it normally is. No new weakness. No CP or SOB.  NO recent trauma or injury. No recent heavy lifting. Reports that certain movements aggravate the pain.     NKDA           PCP: Tiara Cardenas MD    No current facility-administered medications for this encounter.     Current Outpatient Medications   Medication Sig Dispense Refill    ibuprofen (ADVIL;MOTRIN) 600 MG tablet Take 1 tablet by mouth 4 times daily as needed for Pain 40 tablet 0    amLODIPine (NORVASC) 10 MG tablet TAKE 1 TABLET BY MOUTH ONCE DAILY      aspirin 81 MG chewable tablet Take 81 mg by mouth daily      furosemide (LASIX) 20 MG tablet Take 20 mg by mouth daily      lisinopril (PRINIVIL;ZESTRIL) 2.5 MG tablet TAKE 1 TABLET BY MOUTH ONCE DAILY      pioglitazone-metFORMIN (ACTOPLUS MET)  MG per tablet Take 1 tablet by mouth 2 times daily (with meals)         Past History     Past Medical History:   Past Medical History:   Diagnosis Date    Asthma 1/11/2010    Diabetes (HCC)     DJD (degenerative joint disease) 1/11/2010    DJD of shoulder     DM (diabetes mellitus) (HCC) 1/11/2010    Essential hypertension, benign

## 2025-02-26 ENCOUNTER — HOSPITAL ENCOUNTER (EMERGENCY)
Facility: HOSPITAL | Age: 89
Discharge: HOME | End: 2025-02-27
Attending: EMERGENCY MEDICINE
Payer: MEDICARE

## 2025-02-26 VITALS
DIASTOLIC BLOOD PRESSURE: 58 MMHG | TEMPERATURE: 100.1 F | RESPIRATION RATE: 20 BRPM | HEIGHT: 68 IN | SYSTOLIC BLOOD PRESSURE: 124 MMHG | HEART RATE: 112 BPM | OXYGEN SATURATION: 98 % | WEIGHT: 205 LBS | BODY MASS INDEX: 31.07 KG/M2

## 2025-02-26 DIAGNOSIS — R33.8 ACUTE URINARY RETENTION: Primary | ICD-10-CM

## 2025-02-26 PROCEDURE — 99283 EMERGENCY DEPT VISIT LOW MDM: CPT

## 2025-02-26 RX ORDER — GABAPENTIN 100 MG/1
100 CAPSULE ORAL 3 TIMES DAILY
COMMUNITY

## 2025-02-26 ASSESSMENT — PAIN DESCRIPTION - LOCATION: LOCATION: PENIS

## 2025-02-26 ASSESSMENT — PAIN - FUNCTIONAL ASSESSMENT
PAIN_FUNCTIONAL_ASSESSMENT: ACTIVITIES ARE NOT PREVENTED
PAIN_FUNCTIONAL_ASSESSMENT: 0-10

## 2025-02-26 ASSESSMENT — PAIN DESCRIPTION - FREQUENCY: FREQUENCY: CONTINUOUS

## 2025-02-26 ASSESSMENT — PAIN SCALES - GENERAL: PAINLEVEL_OUTOF10: 8

## 2025-02-26 ASSESSMENT — PAIN DESCRIPTION - DESCRIPTORS: DESCRIPTORS: ACHING

## 2025-02-26 ASSESSMENT — PAIN DESCRIPTION - ORIENTATION: ORIENTATION: OTHER (COMMENT)

## 2025-02-26 ASSESSMENT — LIFESTYLE VARIABLES
HOW OFTEN DO YOU HAVE A DRINK CONTAINING ALCOHOL: NEVER
HOW MANY STANDARD DRINKS CONTAINING ALCOHOL DO YOU HAVE ON A TYPICAL DAY: PATIENT DOES NOT DRINK

## 2025-02-26 ASSESSMENT — PAIN DESCRIPTION - PAIN TYPE: TYPE: ACUTE PAIN

## 2025-02-26 ASSESSMENT — PAIN DESCRIPTION - ONSET: ONSET: PROGRESSIVE

## 2025-02-27 LAB
APPEARANCE UR: CLEAR
BILIRUB UR QL: NEGATIVE
COLOR UR: YELLOW
GLUCOSE UR STRIP.AUTO-MCNC: NEGATIVE MG/DL
HGB UR QL STRIP: NEGATIVE
KETONES UR QL STRIP.AUTO: NEGATIVE MG/DL
LEUKOCYTE ESTERASE UR QL STRIP.AUTO: NEGATIVE
NITRITE UR QL STRIP.AUTO: NEGATIVE
PH UR STRIP: 7 (ref 5–8)
PROT UR STRIP-MCNC: NEGATIVE MG/DL
SP GR UR REFRACTOMETRY: 1.01 (ref 1–1.03)
UROBILINOGEN UR QL STRIP.AUTO: 0.2 EU/DL (ref 0.2–1)

## 2025-02-27 PROCEDURE — 81003 URINALYSIS AUTO W/O SCOPE: CPT

## 2025-02-27 ASSESSMENT — ENCOUNTER SYMPTOMS
RESPIRATORY NEGATIVE: 1
ABDOMINAL PAIN: 1

## 2025-02-27 NOTE — ED NOTES
Discharge instructions and extensive education provided on godwin management reviewed with patient. Patient verbalized understanding. PIV removed. Patient ambulatory to the ED lobby in no acute distress.

## 2025-02-27 NOTE — ED TRIAGE NOTES
Pt to ED reports urinary retention and penial pain onset 2pm. Last urinated this am around 0630

## 2025-02-27 NOTE — ED NOTES
At bedside provider at bedside for assessment , verbal order for indwelling cath for urinary retention , 16Fr placed, urine return noted, urine sample obtained , specimen sen to lab. Pt tolerated well

## 2025-02-27 NOTE — ED PROVIDER NOTES
[facility] EMERGENCY DEPARTMENT  eMERGENCY dEPARTMENT eNCOUnter      Pt Name: Roberto Cao Jr.  MRN: [id number]  Birthdate 1935 of evaluation: 2/26/2025  Provider:Rafita Vargas MD    CHIEF COMPLAINT       HPI    Roberto Cao Jr. is a 89 y.o. male  C/O having urinary retention that started 10 hours ago.  C/O having suprapubic pain.    ROS    Review of Systems   Constitutional:  Positive for activity change.   HENT: Negative.     Respiratory: Negative.     Cardiovascular: Negative.    Gastrointestinal:  Positive for abdominal pain (suprapubic discomfort).   Genitourinary:  Positive for urgency.   All other systems reviewed and are negative.      Except as noted above the remainder of the review of systems was reviewed and negative.       PAST MEDICAL HISTORY     Past Medical History:   Diagnosis Date    Asthma 1/11/2010    Diabetes (HCC)     DJD (degenerative joint disease) 1/11/2010    DJD of shoulder     DM (diabetes mellitus) (HCC) 1/11/2010    Essential hypertension, benign     HTN (hypertension), benign 1/11/2010    Hypercholesteremia 1/11/2010    Sleep apnea          SURGICAL HISTORY       Past Surgical History:   Procedure Laterality Date    CYST REMOVAL           CURRENTMEDICATIONS       Previous Medications    AMLODIPINE (NORVASC) 10 MG TABLET    TAKE 1 TABLET BY MOUTH ONCE DAILY    ASPIRIN 81 MG CHEWABLE TABLET    Take 1 tablet by mouth daily    FUROSEMIDE (LASIX) 20 MG TABLET    Take 1 tablet by mouth daily    GABAPENTIN (NEURONTIN) 100 MG CAPSULE    Take 1 capsule by mouth 3 times daily. Max Daily Amount: 300 mg    IBUPROFEN (ADVIL;MOTRIN) 600 MG TABLET    Take 1 tablet by mouth 4 times daily as needed for Pain    LISINOPRIL (PRINIVIL;ZESTRIL) 2.5 MG TABLET    TAKE 1 TABLET BY MOUTH ONCE DAILY    PIOGLITAZONE-METFORMIN (ACTOPLUS MET)  MG PER TABLET    Take 1 tablet by mouth 2 times daily (with meals)       ALLERGIES     Patient has no known allergies.    FAMILY HISTORY
